# Patient Record
Sex: MALE | Race: BLACK OR AFRICAN AMERICAN | NOT HISPANIC OR LATINO | Employment: STUDENT | URBAN - METROPOLITAN AREA
[De-identification: names, ages, dates, MRNs, and addresses within clinical notes are randomized per-mention and may not be internally consistent; named-entity substitution may affect disease eponyms.]

---
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
## 2024-07-13 ENCOUNTER — HOSPITAL ENCOUNTER (EMERGENCY)
Facility: HOSPITAL | Age: 19
Discharge: HOME | End: 2024-07-14
Attending: SURGERY

## 2024-07-13 DIAGNOSIS — T78.40XA ALLERGIC REACTION, INITIAL ENCOUNTER: Primary | ICD-10-CM

## 2024-07-13 PROCEDURE — 96375 TX/PRO/DX INJ NEW DRUG ADDON: CPT

## 2024-07-13 PROCEDURE — 63600175 PHARM REV CODE 636 W HCPCS: Performed by: SURGERY

## 2024-07-13 PROCEDURE — 96361 HYDRATE IV INFUSION ADD-ON: CPT

## 2024-07-13 PROCEDURE — 96372 THER/PROPH/DIAG INJ SC/IM: CPT | Performed by: SURGERY

## 2024-07-13 PROCEDURE — 99284 EMERGENCY DEPT VISIT MOD MDM: CPT | Mod: 25

## 2024-07-13 PROCEDURE — 25000003 PHARM REV CODE 250: Performed by: SURGERY

## 2024-07-13 PROCEDURE — 96374 THER/PROPH/DIAG INJ IV PUSH: CPT

## 2024-07-13 RX ORDER — EPINEPHRINE 0.3 MG/.3ML
1 INJECTION SUBCUTANEOUS ONCE
Qty: 0.3 ML | Refills: 0 | Status: SHIPPED | OUTPATIENT
Start: 2024-07-13 | End: 2024-07-13

## 2024-07-13 RX ORDER — EPINEPHRINE 0.3 MG/.3ML
0.3 INJECTION SUBCUTANEOUS
Status: COMPLETED | OUTPATIENT
Start: 2024-07-13 | End: 2024-07-13

## 2024-07-13 RX ORDER — DIPHENHYDRAMINE HYDROCHLORIDE 50 MG/ML
50 INJECTION INTRAMUSCULAR; INTRAVENOUS
Status: COMPLETED | OUTPATIENT
Start: 2024-07-13 | End: 2024-07-13

## 2024-07-13 RX ORDER — FAMOTIDINE 10 MG/ML
40 INJECTION INTRAVENOUS
Status: COMPLETED | OUTPATIENT
Start: 2024-07-13 | End: 2024-07-13

## 2024-07-13 RX ORDER — METHYLPREDNISOLONE SOD SUCC 125 MG
125 VIAL (EA) INJECTION
Status: COMPLETED | OUTPATIENT
Start: 2024-07-13 | End: 2024-07-13

## 2024-07-13 RX ORDER — DIPHENHYDRAMINE HCL 50 MG
50 CAPSULE ORAL EVERY 6 HOURS PRN
Qty: 20 CAPSULE | Refills: 0 | Status: SHIPPED | OUTPATIENT
Start: 2024-07-13

## 2024-07-13 RX ORDER — PREDNISONE 20 MG/1
40 TABLET ORAL DAILY
Qty: 10 TABLET | Refills: 0 | Status: SHIPPED | OUTPATIENT
Start: 2024-07-13 | End: 2024-07-18

## 2024-07-13 RX ADMIN — DIPHENHYDRAMINE HYDROCHLORIDE 50 MG: 50 INJECTION, SOLUTION INTRAMUSCULAR; INTRAVENOUS at 10:07

## 2024-07-13 RX ADMIN — EPINEPHRINE 0.3 MG: 0.3 INJECTION INTRAMUSCULAR at 10:07

## 2024-07-13 RX ADMIN — FAMOTIDINE 40 MG: 10 INJECTION INTRAVENOUS at 10:07

## 2024-07-13 RX ADMIN — METHYLPREDNISOLONE SODIUM SUCCINATE 125 MG: 125 INJECTION, POWDER, FOR SOLUTION INTRAMUSCULAR; INTRAVENOUS at 10:07

## 2024-07-13 RX ADMIN — SODIUM CHLORIDE 1000 ML: 9 INJECTION, SOLUTION INTRAVENOUS at 10:07

## 2024-07-14 VITALS
DIASTOLIC BLOOD PRESSURE: 78 MMHG | OXYGEN SATURATION: 98 % | HEART RATE: 76 BPM | TEMPERATURE: 98 F | SYSTOLIC BLOOD PRESSURE: 128 MMHG | RESPIRATION RATE: 20 BRPM | BODY MASS INDEX: 29.61 KG/M2 | HEIGHT: 74 IN | WEIGHT: 230.69 LBS

## 2024-07-14 NOTE — ED PROVIDER NOTES
Encounter Date: 7/13/2024       History     Chief Complaint   Patient presents with    Allergic Reaction     PT TO ER WITH C/O ALLERGIC REACTION WHILE EATING DINNER. PT THINKS IT IS FROM SEAFOOD. PT REPORTS THROAT AND LIP SWELLING     History of Present Illness  Lam Rodriguez is a 18 y.o. male that presents with lip swelling tonight  Patient ate fried seafood at [facility] [employer] for dinner tonight  Patient states he began to start to have itching with lip swelling after  Patient with no signs of anaphylaxis, no signs of airway concerns now  No stridor, no drooling, normal patent airway on initial evaluation tonight  No previous issues with seafood with no previous allergic reactions    The history is provided by the patient.     Review of patient's allergies indicates:  No Known Allergies  Past Medical History:   Diagnosis Date    Asthma      History reviewed. No pertinent surgical history.  No family history on file.  Social History     Tobacco Use    Smoking status: Never   Substance Use Topics    Alcohol use: No     Review of Systems   Constitutional:  Negative for diaphoresis, fatigue and fever.   HENT:  Negative for ear pain, hearing loss and tinnitus.    Eyes:  Negative for pain and redness.   Respiratory:  Negative for cough, shortness of breath and wheezing.    Cardiovascular:  Negative for chest pain.   Gastrointestinal:  Negative for abdominal pain, constipation, diarrhea, nausea and rectal pain.   Musculoskeletal:  Negative for back pain, neck pain and neck stiffness.   Skin:         (+) pruritus & lip angioedema   Neurological:  Negative for dizziness, seizures, numbness and headaches.   Psychiatric/Behavioral:  Negative for confusion, decreased concentration and dysphoric mood.    All other systems reviewed and are negative.      Physical Exam     Initial Vitals [07/13/24 2155]   BP Pulse Resp Temp SpO2   134/89 83 20 98.4 °F (36.9 °C) 96 %      MAP       --         Physical Exam    Nursing note  and vitals reviewed.  Constitutional: Vital signs are normal. He appears well-developed and well-nourished. He is cooperative.   HENT:   Head: Normocephalic and atraumatic.   Mouth/Throat: Uvula is midline and mucous membranes are normal.   (+) upper & lower lip angioedema, normal patent airway  (-) tongue swelling   Eyes: Conjunctivae, EOM and lids are normal. Pupils are equal, round, and reactive to light.   Neck: Neck supple.   Normal range of motion.   Full passive range of motion without pain.     Cardiovascular:  Normal rate, regular rhythm, S1 normal, S2 normal, normal heart sounds, intact distal pulses and normal pulses.           Pulmonary/Chest: Effort normal and breath sounds normal.   Abdominal: Abdomen is soft and flat. Bowel sounds are normal.   Musculoskeletal:         General: Normal range of motion.      Cervical back: Full passive range of motion without pain, normal range of motion and neck supple.     Neurological: He is alert and oriented to person, place, and time. He has normal strength.   Skin: Skin is warm, dry and intact. Capillary refill takes less than 2 seconds.         ED Course   Procedures  Labs Reviewed - No data to display       Imaging Results    None          Medications   sodium chloride 0.9% bolus 1,000 mL 1,000 mL (0 mLs Intravenous Stopped 7/13/24 2310)   diphenhydrAMINE injection 50 mg (50 mg Intravenous Given 7/13/24 2224)   methylPREDNISolone sodium succinate injection 125 mg (125 mg Intravenous Given 7/13/24 2223)   EPINEPHrine (EPIPEN) 0.3 mg/0.3 mL pen injection 0.3 mg (0.3 mg Intramuscular Given 7/13/24 2211)   famotidine (PF) injection 40 mg (40 mg Intravenous Given 7/13/24 2224)     Medical Decision Making  Itching, lip swelling after eating seafood at a restaurant this evening  Differential includes local reaction, allergic reaction, angioedema, anaphylaxis    Problems Addressed:  Allergic reaction, initial encounter: complicated acute illness or injury    ED  Management & Risks of Complication, Morbidity, & Mortality:  Patient given IV Benadryl & steroids immediately in ED  Patient given IV Pepcid immediately in ED this evening  Patient given EpiPen injection with good result in the ER   Angioedema of the lips improved, itching has dissipated  Patient was now feeling completely asymptomatic prior to discharge  Patient monitored in the ER over 3 hours with no other issues noted  Benadryl, steroids prescribed, EpiPen for emergencies on discharge  Follow-up with PCP for definitive allergy testing going forward  Pt/Family counseled to return to the ER with any concerns on DC    Critical Care ED Physician Time (minutes):  -- Performed by: Jose Angel Byrd M.D.  -- Date/Time: 12:19 AM 7/14/2024   -- Direct Patient Care (Face Time): 5  -- Additional History from Records or Taking Additional History: 5  -- Ordering, Reviewing, and Interpreting Diagnostic Studies: 0  -- Total Time in Documentation: 15  -- Consultation with Other Physicians: 0  -- Consultation with Family Related to Condition: 10  -- Total Critical Care Time: 35  -- Critical care was necessary to treat Allergic reaction with EpiPen  -- Critical care was time spent personally by me on the following activities:   -- development of treatment plan with patient & their family  -- examination of patient and evaluation of response to treatment     Need for Hospitalization or Surgery with Social Determinants of Health:  This patient does not need to be hospitalized on ER evaluation today  The patient's diagnosis is not limited by social determinants of health  Does not require surgery or procedure (major or minor), no risk factors    Clinical Impression:  Final diagnoses:  [T78.40XA] Allergic reaction, initial encounter (Primary)          ED Disposition Condition    Discharge Stable          ED Prescriptions       Medication Sig Dispense Start Date End Date Auth. Provider    diphenhydrAMINE (BENADRYL) 50 MG capsule Take 1  capsule (50 mg total) by mouth every 6 (six) hours as needed for Itching. 20 capsule 2024 -- Jose Angel Byrd MD    predniSONE (DELTASONE) 20 MG tablet Take 2 tablets (40 mg total) by mouth once daily. for 5 days 10 tablet 2024 Jose Angel Byrd MD    EPINEPHrine (EPIPEN 2-MARYANA) 0.3 mg/0.3 mL AtIn () Inject 0.3 mLs (0.3 mg total) into the muscle once. for 1 dose 0.3 mL 2024 Jose Angel Byrd MD          Follow-up Information       Follow up With Specialties Details Why Contact Info    [facility] Speech Pathology Schedule an appointment as soon as possible for a visit in 2 days  [address]  [facility] [id number] [phone]               Jose Angel Byrd MD  24 0021

## 2024-07-14 NOTE — ED TRIAGE NOTES
18 y.o. male presents to ER ED 02/ED 02B   Chief Complaint   Patient presents with    Allergic Reaction     PT TO ER WITH C/O ALLERGIC REACTION WHILE EATING DINNER. PT THINKS IT IS FROM SEAFOOD. PT REPORTS THROAT AND LIP SWELLING   . No acute distress noted.

## 2024-07-14 NOTE — ED NOTES
Patient's father to desk wanting update on POC.  Dr. Byrd notified and went to bedside.  States that due to medication given, Epinephrine, patient needs to be monitor for another hour.  Patient's mother and father verbalized understanding.  Denies any further needs at this time.

## 2025-03-18 ENCOUNTER — HOSPITAL ENCOUNTER (EMERGENCY)
Facility: HOSPITAL | Age: 20
Discharge: PSYCHIATRIC OR SUBSTANCE USE FACILITY | End: 2025-03-18
Attending: STUDENT IN AN ORGANIZED HEALTH CARE EDUCATION/TRAINING PROGRAM
Payer: MEDICAID

## 2025-03-18 ENCOUNTER — HOSPITAL ENCOUNTER (INPATIENT)
Facility: HOSPITAL | Age: 20
LOS: 7 days | Discharge: HOME | DRG: 885 | End: 2025-03-25
Attending: PSYCHIATRY & NEUROLOGY | Admitting: PSYCHIATRY & NEUROLOGY
Payer: MEDICAID

## 2025-03-18 VITALS
SYSTOLIC BLOOD PRESSURE: 136 MMHG | HEIGHT: 74 IN | RESPIRATION RATE: 16 BRPM | WEIGHT: 230 LBS | TEMPERATURE: 99 F | HEART RATE: 79 BPM | BODY MASS INDEX: 29.52 KG/M2 | DIASTOLIC BLOOD PRESSURE: 78 MMHG | OXYGEN SATURATION: 99 %

## 2025-03-18 DIAGNOSIS — R45.851 SUICIDAL IDEATION: Primary | ICD-10-CM

## 2025-03-18 DIAGNOSIS — F33.2 MDD (MAJOR DEPRESSIVE DISORDER), RECURRENT SEVERE, WITHOUT PSYCHOSIS: Primary | ICD-10-CM

## 2025-03-18 DIAGNOSIS — R45.851 SUICIDAL IDEATION: ICD-10-CM

## 2025-03-18 LAB
ALBUMIN SERPL BCP-MCNC: 4.6 G/DL (ref 3.5–5.2)
ALP SERPL-CCNC: 71 U/L (ref 40–150)
ALT SERPL W/O P-5'-P-CCNC: 19 U/L (ref 10–44)
AMPHET+METHAMPHET UR QL: NEGATIVE
ANION GAP SERPL CALC-SCNC: 8 MMOL/L (ref 8–16)
APAP SERPL-MCNC: <3 UG/ML (ref 10–20)
AST SERPL-CCNC: 19 U/L (ref 10–40)
BACTERIA #/AREA URNS HPF: NORMAL /HPF
BARBITURATES UR QL SCN>200 NG/ML: NEGATIVE
BASOPHILS # BLD AUTO: 0.12 K/UL (ref 0–0.2)
BASOPHILS NFR BLD: 2.1 % (ref 0–1.9)
BENZODIAZ UR QL SCN>200 NG/ML: NEGATIVE
BILIRUB SERPL-MCNC: 0.4 MG/DL (ref 0.1–1)
BILIRUB UR QL STRIP: NEGATIVE
BUN SERPL-MCNC: 15 MG/DL (ref 6–20)
BZE UR QL SCN: NEGATIVE
CALCIUM SERPL-MCNC: 9.6 MG/DL (ref 8.7–10.5)
CANNABINOIDS UR QL SCN: NEGATIVE
CHLORIDE SERPL-SCNC: 106 MMOL/L (ref 95–110)
CLARITY UR: CLEAR
CO2 SERPL-SCNC: 26 MMOL/L (ref 23–29)
COLOR UR: YELLOW
CREAT SERPL-MCNC: 1.1 MG/DL (ref 0.5–1.4)
CREAT UR-MCNC: >450 MG/DL (ref 23–375)
DIFFERENTIAL METHOD BLD: ABNORMAL
EOSINOPHIL # BLD AUTO: 0.1 K/UL (ref 0–0.5)
EOSINOPHIL NFR BLD: 1.6 % (ref 0–8)
ERYTHROCYTE [DISTWIDTH] IN BLOOD BY AUTOMATED COUNT: 12.2 % (ref 11.5–14.5)
EST. GFR  (NO RACE VARIABLE): >60 ML/MIN/1.73 M^2
ETHANOL SERPL-MCNC: <10 MG/DL
GLUCOSE SERPL-MCNC: 109 MG/DL (ref 70–110)
GLUCOSE UR QL STRIP: NEGATIVE
HCT VFR BLD AUTO: 41.7 % (ref 40–54)
HGB BLD-MCNC: 14.6 G/DL (ref 14–18)
HGB UR QL STRIP: NEGATIVE
HYALINE CASTS #/AREA URNS LPF: 0 /LPF
IMM GRANULOCYTES # BLD AUTO: 0.02 K/UL (ref 0–0.04)
IMM GRANULOCYTES NFR BLD AUTO: 0.4 % (ref 0–0.5)
KETONES UR QL STRIP: ABNORMAL
LEUKOCYTE ESTERASE UR QL STRIP: NEGATIVE
LYMPHOCYTES # BLD AUTO: 1.6 K/UL (ref 1–4.8)
LYMPHOCYTES NFR BLD: 27.4 % (ref 18–48)
MCH RBC QN AUTO: 28.9 PG (ref 27–31)
MCHC RBC AUTO-ENTMCNC: 35 G/DL (ref 32–36)
MCV RBC AUTO: 83 FL (ref 82–98)
METHADONE UR QL SCN>300 NG/ML: NEGATIVE
MICROSCOPIC COMMENT: NORMAL
MONOCYTES # BLD AUTO: 0.4 K/UL (ref 0.3–1)
MONOCYTES NFR BLD: 7 % (ref 4–15)
NEUTROPHILS # BLD AUTO: 3.5 K/UL (ref 1.8–7.7)
NEUTROPHILS NFR BLD: 61.5 % (ref 38–73)
NITRITE UR QL STRIP: NEGATIVE
NRBC BLD-RTO: 0 /100 WBC
OPIATES UR QL SCN: NEGATIVE
PCP UR QL SCN>25 NG/ML: NEGATIVE
PH UR STRIP: 6 [PH] (ref 5–8)
PLATELET # BLD AUTO: 226 K/UL (ref 150–450)
PMV BLD AUTO: 9.2 FL (ref 9.2–12.9)
POTASSIUM SERPL-SCNC: 3.7 MMOL/L (ref 3.5–5.1)
PROT SERPL-MCNC: 8.2 G/DL (ref 6–8.4)
PROT UR QL STRIP: ABNORMAL
RBC # BLD AUTO: 5.05 M/UL (ref 4.6–6.2)
RBC #/AREA URNS HPF: 0 /HPF (ref 0–4)
SODIUM SERPL-SCNC: 140 MMOL/L (ref 136–145)
SP GR UR STRIP: >=1.03 (ref 1–1.03)
SQUAMOUS #/AREA URNS HPF: 1 /HPF
TOXICOLOGY INFORMATION: ABNORMAL
URN SPEC COLLECT METH UR: ABNORMAL
UROBILINOGEN UR STRIP-ACNC: NEGATIVE EU/DL
WBC # BLD AUTO: 5.69 K/UL (ref 3.9–12.7)
WBC #/AREA URNS HPF: 1 /HPF (ref 0–5)

## 2025-03-18 PROCEDURE — 11400000 HC PSYCH PRIVATE ROOM

## 2025-03-18 PROCEDURE — 80143 DRUG ASSAY ACETAMINOPHEN: CPT | Performed by: STUDENT IN AN ORGANIZED HEALTH CARE EDUCATION/TRAINING PROGRAM

## 2025-03-18 PROCEDURE — 80307 DRUG TEST PRSMV CHEM ANLYZR: CPT | Performed by: STUDENT IN AN ORGANIZED HEALTH CARE EDUCATION/TRAINING PROGRAM

## 2025-03-18 PROCEDURE — 82077 ASSAY SPEC XCP UR&BREATH IA: CPT | Performed by: STUDENT IN AN ORGANIZED HEALTH CARE EDUCATION/TRAINING PROGRAM

## 2025-03-18 PROCEDURE — 85025 COMPLETE CBC W/AUTO DIFF WBC: CPT | Performed by: STUDENT IN AN ORGANIZED HEALTH CARE EDUCATION/TRAINING PROGRAM

## 2025-03-18 PROCEDURE — 80061 LIPID PANEL: CPT | Performed by: PSYCHIATRY & NEUROLOGY

## 2025-03-18 PROCEDURE — 25000003 PHARM REV CODE 250: Performed by: PSYCHIATRY & NEUROLOGY

## 2025-03-18 PROCEDURE — 80053 COMPREHEN METABOLIC PANEL: CPT | Performed by: STUDENT IN AN ORGANIZED HEALTH CARE EDUCATION/TRAINING PROGRAM

## 2025-03-18 PROCEDURE — 99231 SBSQ HOSP IP/OBS SF/LOW 25: CPT | Mod: ,,, | Performed by: PHYSICIAN ASSISTANT

## 2025-03-18 PROCEDURE — 99285 EMERGENCY DEPT VISIT HI MDM: CPT

## 2025-03-18 PROCEDURE — 81000 URINALYSIS NONAUTO W/SCOPE: CPT | Mod: 59 | Performed by: STUDENT IN AN ORGANIZED HEALTH CARE EDUCATION/TRAINING PROGRAM

## 2025-03-18 PROCEDURE — 83036 HEMOGLOBIN GLYCOSYLATED A1C: CPT | Performed by: PSYCHIATRY & NEUROLOGY

## 2025-03-18 PROCEDURE — 99223 1ST HOSP IP/OBS HIGH 75: CPT | Mod: ,,, | Performed by: STUDENT IN AN ORGANIZED HEALTH CARE EDUCATION/TRAINING PROGRAM

## 2025-03-18 PROCEDURE — 90833 PSYTX W PT W E/M 30 MIN: CPT | Mod: ,,, | Performed by: STUDENT IN AN ORGANIZED HEALTH CARE EDUCATION/TRAINING PROGRAM

## 2025-03-18 RX ORDER — DIPHENHYDRAMINE HYDROCHLORIDE 50 MG/ML
50 INJECTION, SOLUTION INTRAMUSCULAR; INTRAVENOUS EVERY 6 HOURS PRN
Status: DISCONTINUED | OUTPATIENT
Start: 2025-03-18 | End: 2025-03-18 | Stop reason: HOSPADM

## 2025-03-18 RX ORDER — IBUPROFEN 200 MG
1 TABLET ORAL DAILY PRN
Status: DISCONTINUED | OUTPATIENT
Start: 2025-03-18 | End: 2025-03-25 | Stop reason: HOSPADM

## 2025-03-18 RX ORDER — TALC
6 POWDER (GRAM) TOPICAL NIGHTLY PRN
Status: DISCONTINUED | OUTPATIENT
Start: 2025-03-18 | End: 2025-03-25 | Stop reason: HOSPADM

## 2025-03-18 RX ORDER — HYDROXYZINE PAMOATE 50 MG/1
50 CAPSULE ORAL EVERY 6 HOURS PRN
Status: DISCONTINUED | OUTPATIENT
Start: 2025-03-18 | End: 2025-03-25 | Stop reason: HOSPADM

## 2025-03-18 RX ORDER — IBUPROFEN 200 MG
1 TABLET ORAL DAILY
Status: DISCONTINUED | OUTPATIENT
Start: 2025-03-18 | End: 2025-03-18

## 2025-03-18 RX ORDER — OLANZAPINE 10 MG/1
10 TABLET ORAL EVERY 8 HOURS PRN
Status: DISCONTINUED | OUTPATIENT
Start: 2025-03-18 | End: 2025-03-25 | Stop reason: HOSPADM

## 2025-03-18 RX ORDER — HALOPERIDOL LACTATE 5 MG/ML
5 INJECTION, SOLUTION INTRAMUSCULAR EVERY 6 HOURS PRN
Status: DISCONTINUED | OUTPATIENT
Start: 2025-03-18 | End: 2025-03-18 | Stop reason: HOSPADM

## 2025-03-18 RX ORDER — LORAZEPAM 2 MG/ML
2 INJECTION INTRAMUSCULAR EVERY 4 HOURS PRN
Status: DISCONTINUED | OUTPATIENT
Start: 2025-03-18 | End: 2025-03-18 | Stop reason: HOSPADM

## 2025-03-18 RX ORDER — OLANZAPINE 10 MG/2ML
10 INJECTION, POWDER, FOR SOLUTION INTRAMUSCULAR EVERY 8 HOURS PRN
Status: DISCONTINUED | OUTPATIENT
Start: 2025-03-18 | End: 2025-03-25 | Stop reason: HOSPADM

## 2025-03-18 RX ADMIN — Medication 6 MG: at 08:03

## 2025-03-18 RX ADMIN — HYDROXYZINE PAMOATE 50 MG: 50 CAPSULE ORAL at 08:03

## 2025-03-18 NOTE — PLAN OF CARE
Patient hesitant to make eye concontact; walks slouched with arms crossed.  Denies intentions to harm self and/or others at this time. Denies auditory and/or visual hallucinations.  Affect and emotion blunted and depressed. Minimal interactions with peers and staff; isolates in bed.  Accepts meals and medications.  Discussed medication and plan of care as well as healthy coping skills and techniques; staff will continue to educate and reinforce.    Did NOT attend group.  Did NOT return call from mom.

## 2025-03-18 NOTE — PSYCH
"Meditation/Education:  [facility] : Meditation Exercise  Patient Response:  "Patient attended session and found the session helpful".              "

## 2025-03-18 NOTE — PLAN OF CARE
Plan of care reviewed. Denies intent to harm self or others at this time. Denies hallucinations. Patient isolates in room. He seems reluctant to speak, answering questions in a soft, almost inaudible voice. His responses are minimal. Patient refuses supper, even though encouraged to eat by staff. Gait steady, no falls. Almost no interaction noted with staff and peers. Will continue precautions and monitor for safety.
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
## 2025-03-18 NOTE — PLAN OF CARE
Pt guarded and withdrawn with a flat affect. Pt admits to feeling suicidal with a plan but did not want to discuss the plan. Pt reports his depression and anxiety is a 7 out 10. He also does not want his parents given any information. Patient denies HI no self harming behavior displayed, no aggressive behavior displayed towards others. Patient contracted safety with staff and unit.  Educated, reviewed  and discussed plan of care and medication regiment with this patient. Patient voiced understanding of all teachings

## 2025-03-18 NOTE — PLAN OF CARE
Problem: Adult Behavioral Health Plan of Care  Goal: Optimized Coping Skills in Response to Life Stressors  Outcome: Progressing  Intervention: Promote Effective Coping Strategies  Flowsheets (Taken 3/18/2025 1803)  Supportive Measures:   active listening utilized   counseling provided   relaxation techniques promoted  PROCESS GROUP    Behavior:  PLPC met with patient individually.  Pt was laying in bed with covers over body, but sat up to discuss process group. Pt presents depressed, lack of interest, and withdrawn, speech very low and soft spoken.     Intervention:    During this session, the therapist distributed a worksheet on coping skills. PLPCexplained the significance of coping skills and how they can be helpful in managing stressful situations, particularly in grounding oneself during heightened moments. Patient was then asked to share a stressful situation in which they had to utilize coping skills. Patient described how he/she felt during that time and the specific techniques they used to calm themselves. Patient will use these worksheet as a tool to practice different coping strategies in future stress-inducing situations.    Response:  The patient states he was very tired and laid back down.      0
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
## 2025-03-18 NOTE — MEDICAL/APP STUDENT
" PSYCHIATRY INPATIENT ADMISSION NOTE - H & P      3/18/2025 9:04 AM   Lam Rodriguez   2005   [id number]         DATE OF ADMISSION: 3/18/2025  4:24 AM    SITE: [facility]    CURRENT LEGAL STATUS: PEC and/or CEC      HISTORY    CHIEF COMPLAINT   Lam Rodriguez is a 19 y.o. male with no past psychiatric history currently admitted to the inpatient unit with the following chief complaint: thoughts of death/suicide    HPI   The patient was seen and examined. The chart was reviewed.    The patient presented to the ER on 3/18/2025 .    The patient was medically cleared and admitted to the U.    Patient presents with: Suicidal Ideation    Parents brought into ED following comments that patient is "tired of living" [place] himself. When asked what prompted this response, patient said "It just happened." Patient continues to endorse suicidal ideation.    Said he has had "thoughts of killing myself" for weeks Patient states he was "ready to do something about it", had a plan, hesitant to share. Could not pinpoint an impetus. Rates current depression at 7/10; shifting from 4/10 - 9/10 over the past few weeks. Endorses changes in sleep, problems falling and staying asleep and waking up feeling tired, sleeps about 6hrs a night.    Patient also reports a strained relationship with parents, stating he just feels like they don't want him there. States parents have yelled at him, which scared him, but they have never physically hurt him. They punish him by taking away TV, games, etc.     Used to work at a [employer] company, but stopped working there last year. Stopped working there due to a fuss with a supervisor and he quit.    No alcohol  No tobacco  No illicit substances    Symptoms of Depression: diminished mood - Yes, loss of interest/anhedonia - Yes;  recurrent - Yes, >14 days - Yes, diminished energy - Yes, change in sleep - Yes, change in appetite - " "Yes, diminished concentration or cognition or indecisiveness - Yes, PMA/R -  Yes, excessive guilt or hopelessness or worthlessness - Yes, suicidal ideations - Yes    Changes in Sleep: trouble with initiation- Yes, maintenance, - Yes early morning awakening with inability to return to sleep - No, hypersomnolence - No    Suicidal- active/passive ideations - Yes, organized plans, future intentions - Yes    Homicidal ideations: active/passive ideations - No, organized plans, future intentions - No    Symptoms of psychosis: hallucinations - No, delusions - No, disorganized speech - No, disorganized behavior or abnormal motor behavior - No, or negative symptoms (diminshed emotional expression, avolition, anhedonia, alogia, asociality) - No, active phase symptoms >1 month - No, continuous signs of illness > 6 months - No, since onset of illness decreased level of functioning present - No    Symptoms of dai or hypomania: elevated, expansive, or irritable mood with increased energy or activity - No; > 4 days - No,  >7 days - No; with inflated self-esteem or grandiosity - No, decreased need for sleep - No, increased rate of speech - No, FOI or racing thoughts - No, distractibility - No, increased goal directed activity or PMA - No, risky/disinhibited behavior - No    Symptoms of OLIVIA: excessive anxiety/worry/fear, more days than not, about numerous issues - No, ongoing for >6 months - No, difficult to control - No, with restlessness - No, fatigue - Yes, poor concentration - Yes, irritability - No, muscle tension - No, sleep disturbance - No; causes functionally impairing distress - No    Symptoms of Panic Disorder: recurrent panic attacks (palpitations/heart racing, sweating, shakiness, dyspnea, choking, chest pain/discomfort, Gi symptoms, dizzy/lightheadedness, hot/col flashes, paresthesias, derealization, fear of losing control or fear of dying or fear of "going crazy") - No, precipitated - No, un-precipitated - No, source " of worry and/or behavioral changes secondary for 1 month or longer- No, agoraphobia - No    Symptoms of PTSD: h/o trauma exposure - No; re-experiencing/intrusive symptoms - No, avoidant behavior - No, 2 or more negative alterations in cognition or mood - No, 2 or more hyperarousal symptoms - No; with dissociative symptoms - No, ongoing for 1 or more  months - No    Symptoms of OCD: obsessions (recurrent thoughts/urges/images; intrusive and/or unwanted; uses other thoughts/actions to suppress) - No; compulsions (repetitive behaviors used to lower distress/anxiety/obsessions) - No, time-consuming (over 1 hour per day) or cause significant distress/impairment - - No    Symptoms of Anorexia: restriction of caloric intake leading to significantly low body weight - No, intense fear of gaining weight or persistent behavior that interferes with weight gain even thought at a significantly low weight - No, disturbance in the way in which one's body weight or shape is experienced, undue influence of body weight or shape on self evaluation, or persistent lack of recognition of the seriousness of the current low body weight - No    Symptoms of Bulimia: recurrent episodes of binge eating (definitely larger amount  than what others would eat and lack of a sense of control over eating during episode) - No, recurrent inappropriate compensatory behaviors in order to prevent weight gain (fasting, medications, exercise, vomiting) - No, binges and compensatory behaviors both occur on average at least once a week for 3 months - No, self evaluations is unduly influenced by body shape/weight- - No    Symptoms of Binge eating: recurrent episodes of binge eating (definitely larger amount than what others would eat and lack of a sense of control over eating during episode) - No, 3 or more of following (eating much more rapidly, eating until uncomfortably full, large amounts when not hungry, eating alone because of embarrassed by how much,   feeling disgusted with oneself, depressed or very guilty afterward) - No, distress regarding binges - No, binges occur on average at least once a week for 3 months - No      Substance/s:  Taken in larger amounts or over longer periods than intended: No,  Persistent desire or unsuccessful attempts to cut down or stop: No,  Great deal of time spent seeking, using or recovering from: No,  Craving or strong desire to use: No,  Recurrent use despite failure to meet major role obligation: No,  Continued use despite persistent or recurrent social/interparsonal issues due to use: No,  Important social/work/recreational activities given up due to use: No,  Recurrent use in physically hazardous situations: No,  Continued use despite knowledge of persistent physical or psychological problem: No,  Tolerance (either increased need or diminished effect): No,      PAST PSYCHIATRIC HISTORY  Previous Psychiatric Hospitalizations: No  Previous SI/HI: Yes,  Previous Suicide Attempts: No,   Previous Medication Trials: No,  Psychiatric Care (current & past): No,  History of Psychotherapy: No,  History of Violence: No,  History of sexual/physical abuse: No,    PAST MEDICAL & SURGICAL HISTORY   Past Medical History:   Diagnosis Date    Asthma      No past surgical history on file.      CURRENT PSYCH MEDICATION REGIMEN   No medications    Home Meds:   Prior to Admission medications    Medication Sig Start Date End Date Taking? Authorizing Provider   acetaminophen (TYLENOL) 500 MG tablet Take 2 tablets (1,000 mg total) by mouth 3 (three) times daily as needed for Pain. 11/4/16   Quyen Johnston MD   albuterol (PROVENTIL) 2.5 mg /3 mL (0.083 %) nebulizer solution Take 3 mLs (2.5 mg total) by nebulization every 6 (six) hours as needed for Wheezing. 5/29/17 5/29/18  Jose Angel Byrd MD   amoxicillin-clavulanate 875-125mg (AUGMENTIN) 875-125 mg per tablet Take 1 tablet by mouth 2 (two) times daily. 2/22/22   Ivonne Brown, NP  "  diphenhydrAMINE (BENADRYL) 50 MG capsule Take 1 capsule (50 mg total) by mouth every 6 (six) hours as needed for Itching. 7/13/24   Jose Angel Byrd MD   EPINEPHrine (EPIPEN 2-MARYANA) 0.3 mg/0.3 mL AtIn Inject 0.3 mLs (0.3 mg total) into the muscle once. for 1 dose 7/13/24 7/13/24  Jose Angel Byrd MD   ibuprofen (ADVIL,MOTRIN) 400 MG tablet Take 1 tablet (400 mg total) by mouth every 6 (six) hours as needed (pain). 11/7/18   Shavon Cantu NP       Scheduled Meds:        PRN Meds:   Current Facility-Administered Medications:     hydrOXYzine pamoate, 50 mg, Oral, Q6H PRN    melatonin, 6 mg, Oral, Nightly PRN    nicotine, 1 patch, Transdermal, Daily PRN    OLANZapine, 10 mg, Oral, Q8H PRN **AND** OLANZapine, 10 mg, Intramuscular, Q8H PRN   Psychotherapeutics (From admission, onward)      Start     Stop Route Frequency Ordered    03/18/25 0510  OLANZapine tablet 10 mg  (Olanzapine PRN (</= 66 yo))        Placed in "And" Linked Group    -- Oral Every 8 hours PRN 03/18/25 0438    03/18/25 0510  OLANZapine injection 10 mg  (Olanzapine PRN (</= 66 yo))        Placed in "And" Linked Group    -- IM Every 8 hours PRN 03/18/25 0438            ALLERGIES   Review of patient's allergies indicates:   Allergen Reactions    Shellfish containing products Hives, Itching and Edema       NEUROLOGIC HISTORY  Seizures: No  Head trauma: No    SOCIAL HISTORY:  Developmental/Childhood:Achieved all developmental milestones timely  Education:High School Diploma  Employment Status/Finances:Unemployed   Relationship Status/Sexual Orientation: single  Children: 0  Housing Status: Home    history:  NO   Access to Firearms: does not know ;  Locked up? n/a  Presybeterian:Non-spiritual  Recreational activities: watching TV, action movies    SUBSTANCE ABUSE HISTORY   Recreational Drugs:  none    Use of Alcohol: denied  Rehab History:no   Tobacco Use:no    LEGAL HISTORY:   Past charges/incarcerations: NO  Pending charges:NO    FAMILY PSYCHIATRIC " HISTORY   No family history on file.    Biological father has bipolar/schizophrenia       ROS  Review of Systems   Constitutional: Negative.    Respiratory:  Negative for cough, shortness of breath and wheezing.    Cardiovascular:  Negative for chest pain and palpitations.   Gastrointestinal: Negative.  Negative for nausea and vomiting.   Genitourinary: Negative.    Psychiatric/Behavioral:  Positive for depression and suicidal ideas.          EXAMINATION    PHYSICAL EXAM  Reviewed note/exam by Dr. Soriano from  at [facility]    VITALS   Vitals:    03/18/25 0713   BP: (!) 103/57   Pulse: 87   Resp: 18   Temp: 98.4 °F (36.9 °C)        Body mass index is 31.48 kg/m².        PAIN  0/10  Subjective report of pain matches objective signs and symptoms: Yes    LABORATORY DATA   Recent Results (from the past 72 hours)   CBC auto differential    Collection Time: 03/18/25  1:33 AM   Result Value Ref Range    WBC 5.69 3.90 - 12.70 K/uL    RBC 5.05 4.60 - 6.20 M/uL    Hemoglobin 14.6 14.0 - 18.0 g/dL    Hematocrit 41.7 40.0 - 54.0 %    MCV 83 82 - 98 fL    MCH 28.9 27.0 - 31.0 pg    MCHC 35.0 32.0 - 36.0 g/dL    RDW 12.2 11.5 - 14.5 %    Platelets 226 150 - 450 K/uL    MPV 9.2 9.2 - 12.9 fL    Immature Granulocytes 0.4 0.0 - 0.5 %    Gran # (ANC) 3.5 1.8 - 7.7 K/uL    Immature Grans (Abs) 0.02 0.00 - 0.04 K/uL    Lymph # 1.6 1.0 - 4.8 K/uL    Mono # 0.4 0.3 - 1.0 K/uL    Eos # 0.1 0.0 - 0.5 K/uL    Baso # 0.12 0.00 - 0.20 K/uL    nRBC 0 0 /100 WBC    Gran % 61.5 38.0 - 73.0 %    Lymph % 27.4 18.0 - 48.0 %    Mono % 7.0 4.0 - 15.0 %    Eosinophil % 1.6 0.0 - 8.0 %    Basophil % 2.1 (H) 0.0 - 1.9 %    Differential Method Automated    Comprehensive metabolic panel    Collection Time: 03/18/25  1:33 AM   Result Value Ref Range    Sodium 140 136 - 145 mmol/L    Potassium 3.7 3.5 - 5.1 mmol/L    Chloride 106 95 - 110 mmol/L    CO2 26 23 - 29 mmol/L    Glucose 109 70 - 110 mg/dL    BUN 15 6 - 20 mg/dL    Creatinine 1.1 0.5 -  "1.4 mg/dL    Calcium 9.6 8.7 - 10.5 mg/dL    Total Protein 8.2 6.0 - 8.4 g/dL    Albumin 4.6 3.5 - 5.2 g/dL    Total Bilirubin 0.4 0.1 - 1.0 mg/dL    Alkaline Phosphatase 71 40 - 150 U/L    AST 19 10 - 40 U/L    ALT 19 10 - 44 U/L    eGFR >60 >60 mL/min/1.73 m^2    Anion Gap 8 8 - 16 mmol/L   Ethanol    Collection Time: 03/18/25  1:33 AM   Result Value Ref Range    Alcohol, Serum <10 <10 mg/dL   Acetaminophen level    Collection Time: 03/18/25  1:33 AM   Result Value Ref Range    Acetaminophen (Tylenol), Serum <3.0 (L) 10.0 - 20.0 ug/mL   Urinalysis, Reflex to Urine Culture Urine, Clean Catch    Collection Time: 03/18/25  3:12 AM    Specimen: Urine   Result Value Ref Range    Specimen UA Urine, Clean Catch     Color, UA Yellow Yellow, Straw, Jody    Appearance, UA Clear Clear    pH, UA 6.0 5.0 - 8.0    Specific Gravity, UA >=1.030 (A) 1.005 - 1.030    Protein, UA 2+ (A) Negative    Glucose, UA Negative Negative    Ketones, UA 1+ (A) Negative    Bilirubin (UA) Negative Negative    Occult Blood UA Negative Negative    Nitrite, UA Negative Negative    Urobilinogen, UA Negative <2.0 EU/dL    Leukocytes, UA Negative Negative   Drug screen panel, emergency    Collection Time: 03/18/25  3:12 AM   Result Value Ref Range    Benzodiazepines Negative Negative    Methadone metabolites Negative Negative    Cocaine (Metab.) Negative Negative    Opiate Scrn, Ur Negative Negative    Barbiturate Screen, Ur Negative Negative    Amphetamine Screen, Ur Negative Negative    THC Negative Negative    Phencyclidine Negative Negative    Creatinine, Urine >450.0 (H) 23.0 - 375.0 mg/dL    Toxicology Information SEE COMMENT    Urinalysis Microscopic    Collection Time: 03/18/25  3:12 AM   Result Value Ref Range    RBC, UA 0 0 - 4 /hpf    WBC, UA 1 0 - 5 /hpf    Bacteria Rare None-Occ /hpf    Squam Epithel, UA 1 /hpf    Hyaline Casts, UA 0 0-1/lpf /lpf    Microscopic Comment SEE COMMENT       No results found for: "PHENYTOIN", "PHENOBARB", " ""VALPROATE", "CBMZ"        CONSTITUTIONAL  General Appearance: unremarkable, age appropriate    MUSCULOSKELETAL  Muscle Strength and Tone:no tremor, no tic  Abnormal Involuntary Movements: No  Gait and Station: non-ataxic    PSYCHIATRIC   Level of Consciousness: awake and lethargic  Orientation: person, place, and situation  Grooming: Hospital garb and Well groomed  Psychomotor Behavior: normal, cooperative, eye contact minimal  Speech: normal pitch, slowed, increased latency of response, soft, monotone  Language: grossly intact  Mood: depressed and sad  Affect: Consistent with mood and Flat  Thought Process: linear, logical  Associations: intact   Thought Content: +SI, denies HI, and no delusions  Perceptions: +AH  Memory: Able to recall past events, Remote intact, and Recent intact  Attention:Attends to interview without distraction  Fund of Knowledge: Aware of current events and Vocabulary appropriate   Estimate if Intelligence:  Average based on work/education history, vocabulary and mental status exam  Insight: has awareness of illness  Judgment: behavior is adequate to circumstances      PSYCHOSOCIAL    PSYCHOSOCIAL STRESSORS   family    FUNCTIONING RELATIONSHIPS   alone & isolated and poor relationship with parents    STRENGTHS AND LIABILITIES   Strength: Patient is physically healthy.    Is the patient aware of the biomedical complications associated with substance abuse and mental illness? yes    Does the patient have an Advance Directive for Mental Health treatment? no  (If yes, inform patient to bring copy.)        MEDICAL DECISION MAKING      ASSESSMENT   Major depressive episode, severe, with suicidal ideation  Unspecific anxiety disorder    Psychosocial stressors    Asthma      PROBLEM LIST AND MANAGEMENT PLANS    Major depressive episode, severe, with suicidal ideation  -patient in U for safety  -patient not given any meds upon arrival  -trial SSRI; escitalopram 10mg PO qd   -up titrate until sx " under control (20mg qd escitalopram max)  -trazodone PO @ bedtime for sleep  -refer for psychotherapy    Unspecified anxiety disorder  -start escitalopram 10mg pO qd per above  -trazodone PO @ bedtime for sleep  -refer for psychotherapy    Psychosocial stressors  -involve SW for assistance in resources    Asthma  -chronic condition  -no longer taking inhaler PRN  -meds reviewed      Discussed diagnosis, risks and benefits of proposed treatment vs alternative treatments vs no treatment, potential side effects of these treatments and the inherent unpredictability of treatment. The patient expresses understanding of the above and displays the capacity to agree with this treatment given said understanding. Patient also agrees that, currently, the benefits outweigh the risks and would like to pursue/continue treatment at this time.    Any medications being used off-label were discussed with the patient inclusive of the evidence base for the use of the medications and consent was obtained for the off-label use of the medication.       DIAGNOSTIC TESTING  Labs reviewed with patient; follow up pending labs    Disposition:  -Will attempt to obtain outside psychiatric records if available  -SW to assist with aftercare planning and collateral  -Once stable discharge home with outpatient follow up care and/or rehab  -Continue inpatient treatment under a PEC and/or CEC for danger to self/ danger to others/grave disability as evident by danger to self, gravely disabled, and suicidal ideation        Mary Ellen Fountain, MS3  Psychiatry

## 2025-03-18 NOTE — PROGRESS NOTES
"   03/18/25 1246   [facility] Group Therapy   Group Name Therapeutic Recreation   Specific Interventions Cognitive Stimulation Training   Participation Level Minimal   Participation Quality Lack of Interest;Withdrawn   Insight/Motivation Limited   Affect/Mood Display Depressed;Flat   Cognition Alert   Psychomotor WNL     Patient presents depressed, flat, withdrawn, lacks interest, speech low & softly spoken, poor eye contact. Patient shared he is thankful for "my parents helping me." Patient then asked "do I have to do this and got up and returned to his room.  "

## 2025-03-19 LAB
CHOLEST SERPL-MCNC: 104 MG/DL (ref 120–199)
CHOLEST/HDLC SERPL: 1.9 {RATIO} (ref 2–5)
ESTIMATED AVG GLUCOSE: 97 MG/DL (ref 68–131)
HBA1C MFR BLD: 5 % (ref 4–5.6)
HDLC SERPL-MCNC: 56 MG/DL (ref 40–75)
HDLC SERPL: 53.8 % (ref 20–50)
LDLC SERPL CALC-MCNC: 35.4 MG/DL (ref 63–159)
NONHDLC SERPL-MCNC: 48 MG/DL
TRIGL SERPL-MCNC: 63 MG/DL (ref 30–150)

## 2025-03-19 PROCEDURE — 11400000 HC PSYCH PRIVATE ROOM

## 2025-03-19 PROCEDURE — 25000003 PHARM REV CODE 250: Performed by: PSYCHIATRY & NEUROLOGY

## 2025-03-19 PROCEDURE — 25000003 PHARM REV CODE 250: Performed by: STUDENT IN AN ORGANIZED HEALTH CARE EDUCATION/TRAINING PROGRAM

## 2025-03-19 PROCEDURE — 99233 SBSQ HOSP IP/OBS HIGH 50: CPT | Mod: ,,, | Performed by: STUDENT IN AN ORGANIZED HEALTH CARE EDUCATION/TRAINING PROGRAM

## 2025-03-19 PROCEDURE — 90833 PSYTX W PT W E/M 30 MIN: CPT | Mod: ,,, | Performed by: STUDENT IN AN ORGANIZED HEALTH CARE EDUCATION/TRAINING PROGRAM

## 2025-03-19 RX ORDER — ESCITALOPRAM OXALATE 10 MG/1
10 TABLET ORAL DAILY
Status: DISCONTINUED | OUTPATIENT
Start: 2025-03-19 | End: 2025-03-24

## 2025-03-19 RX ADMIN — Medication 6 MG: at 08:03

## 2025-03-19 RX ADMIN — ESCITALOPRAM OXALATE 10 MG: 10 TABLET ORAL at 11:03

## 2025-03-19 RX ADMIN — HYDROXYZINE PAMOATE 50 MG: 50 CAPSULE ORAL at 08:03

## 2025-03-19 NOTE — PSYCH
"Meditation/Education :  Meet Keesha : Meditation Exercise  Patient Response:  "Patient attended session and found the session helpful".              "

## 2025-03-19 NOTE — SUBJECTIVE & OBJECTIVE
Past Medical History:   Diagnosis Date    Asthma        No past surgical history on file.    Review of patient's allergies indicates:   Allergen Reactions    Shellfish containing products Hives, Itching and Edema       Current Facility-Administered Medications on File Prior to Encounter   Medication    [DISCONTINUED] diphenhydrAMINE injection 50 mg    [DISCONTINUED] haloperidol lactate injection 5 mg    [DISCONTINUED] LORazepam injection 2 mg     Current Outpatient Medications on File Prior to Encounter   Medication Sig    acetaminophen (TYLENOL) 500 MG tablet Take 2 tablets (1,000 mg total) by mouth 3 (three) times daily as needed for Pain.    albuterol (PROVENTIL) 2.5 mg /3 mL (0.083 %) nebulizer solution Take 3 mLs (2.5 mg total) by nebulization every 6 (six) hours as needed for Wheezing.    amoxicillin-clavulanate 875-125mg (AUGMENTIN) 875-125 mg per tablet Take 1 tablet by mouth 2 (two) times daily.    diphenhydrAMINE (BENADRYL) 50 MG capsule Take 1 capsule (50 mg total) by mouth every 6 (six) hours as needed for Itching.    EPINEPHrine (EPIPEN 2-MARYANA) 0.3 mg/0.3 mL AtIn Inject 0.3 mLs (0.3 mg total) into the muscle once. for 1 dose    ibuprofen (ADVIL,MOTRIN) 400 MG tablet Take 1 tablet (400 mg total) by mouth every 6 (six) hours as needed (pain).     Family History    None       Tobacco Use    Smoking status: Never    Smokeless tobacco: Not on file   Substance and Sexual Activity    Alcohol use: No    Drug use: Not on file    Sexual activity: Not on file     Review of Systems   Constitutional:  Negative for chills and fever.   Respiratory:  Negative for cough, shortness of breath and wheezing.    Cardiovascular:  Negative for chest pain and palpitations.   Gastrointestinal:  Negative for abdominal distention, constipation, diarrhea, nausea and vomiting.   Genitourinary:  Negative for difficulty urinating and dysuria.     Objective:     Vital Signs (Most Recent):  Temp: 98.2 °F (36.8 °C) (03/18/25 1912)  Pulse:  72 (03/18/25 1912)  Resp: 20 (03/18/25 1912)  BP: 122/67 (03/18/25 1912)  SpO2: 100 % (03/18/25 1912) Vital Signs (24h Range):  Temp:  [97.8 °F (36.6 °C)-99.2 °F (37.3 °C)] 98.2 °F (36.8 °C)  Pulse:  [68-87] 72  Resp:  [16-20] 20  SpO2:  [99 %-100 %] 100 %  BP: (103-158)/(57-98) 122/67     Weight: 105.3 kg (232 lb 2.3 oz)  Body mass index is 31.48 kg/m².     Physical Exam  Constitutional:       Appearance: Normal appearance.   HENT:      Head: Normocephalic and atraumatic.   Cardiovascular:      Rate and Rhythm: Normal rate and regular rhythm.   Pulmonary:      Effort: Pulmonary effort is normal.      Breath sounds: Normal breath sounds.   Abdominal:      General: There is no distension.      Tenderness: There is no abdominal tenderness.   Musculoskeletal:         General: No swelling or tenderness.      Cervical back: Neck supple.      Right lower leg: No edema.      Left lower leg: No edema.   Skin:     General: Skin is warm and dry.   Neurological:      Mental Status: He is alert.      Cranial Nerves: Cranial nerves 2-12 are intact.      Comments: CN II: Visual Fields full to confrontation  CN III, IV , VI PERRL   CN III: no palsy   Nystagmus: none  Diplopia: none  Ophthalmoparesis: none  CN V Facial sensation intact  CN VII facial expression full, symmetric  CN VIII normal  CN IX normal  CN X normal  CN XI normal  CN XII normal           Significant Labs: UPT  No results found for this or any previous visit.  U/A  Negative for UTI   UDS  Results for orders placed or performed during the hospital encounter of 03/18/25   Drug screen panel, emergency    Collection Time: 03/18/25  3:12 AM   Result Value Ref Range    Benzodiazepines Negative Negative    Methadone metabolites Negative Negative    Cocaine (Metab.) Negative Negative    Opiate Scrn, Ur Negative Negative    Barbiturate Screen, Ur Negative Negative    Amphetamine Screen, Ur Negative Negative    THC Negative Negative    Phencyclidine Negative Negative     Creatinine, Urine >450.0 (H) 23.0 - 375.0 mg/dL    Toxicology Information SEE COMMENT      CBC  Results for orders placed or performed during the hospital encounter of 03/18/25   CBC auto differential    Collection Time: 03/18/25  1:33 AM   Result Value Ref Range    WBC 5.69 3.90 - 12.70 K/uL    RBC 5.05 4.60 - 6.20 M/uL    Hemoglobin 14.6 14.0 - 18.0 g/dL    Hematocrit 41.7 40.0 - 54.0 %    MCV 83 82 - 98 fL    MCH 28.9 27.0 - 31.0 pg    MCHC 35.0 32.0 - 36.0 g/dL    RDW 12.2 11.5 - 14.5 %    Platelets 226 150 - 450 K/uL    MPV 9.2 9.2 - 12.9 fL    Immature Granulocytes 0.4 0.0 - 0.5 %    Gran # (ANC) 3.5 1.8 - 7.7 K/uL    Immature Grans (Abs) 0.02 0.00 - 0.04 K/uL    Lymph # 1.6 1.0 - 4.8 K/uL    Mono # 0.4 0.3 - 1.0 K/uL    Eos # 0.1 0.0 - 0.5 K/uL    Baso # 0.12 0.00 - 0.20 K/uL    nRBC 0 0 /100 WBC    Gran % 61.5 38.0 - 73.0 %    Lymph % 27.4 18.0 - 48.0 %    Mono % 7.0 4.0 - 15.0 %    Eosinophil % 1.6 0.0 - 8.0 %    Basophil % 2.1 (H) 0.0 - 1.9 %    Differential Method Automated      CMP  Results for orders placed or performed during the hospital encounter of 03/18/25   Comprehensive metabolic panel    Collection Time: 03/18/25  1:33 AM   Result Value Ref Range    Sodium 140 136 - 145 mmol/L    Potassium 3.7 3.5 - 5.1 mmol/L    Chloride 106 95 - 110 mmol/L    CO2 26 23 - 29 mmol/L    Glucose 109 70 - 110 mg/dL    BUN 15 6 - 20 mg/dL    Creatinine 1.1 0.5 - 1.4 mg/dL    Calcium 9.6 8.7 - 10.5 mg/dL    Total Protein 8.2 6.0 - 8.4 g/dL    Albumin 4.6 3.5 - 5.2 g/dL    Total Bilirubin 0.4 0.1 - 1.0 mg/dL    Alkaline Phosphatase 71 40 - 150 U/L    AST 19 10 - 40 U/L    ALT 19 10 - 44 U/L    eGFR >60 >60 mL/min/1.73 m^2    Anion Gap 8 8 - 16 mmol/L     TSH  No results found for this or any previous visit.  ETOH  Results for orders placed or performed during the hospital encounter of 03/18/25   Ethanol    Collection Time: 03/18/25  1:33 AM   Result Value Ref Range    Alcohol, Serum <10 <10 mg/dL     Salicylate  No  results found for this or any previous visit.  Acetaminophen  Results for orders placed or performed during the hospital encounter of 03/18/25   Acetaminophen level    Collection Time: 03/18/25  1:33 AM   Result Value Ref Range    Acetaminophen (Tylenol), Serum <3.0 (L) 10.0 - 20.0 ug/mL             Significant Imaging: none

## 2025-03-19 NOTE — CONSULTS
[place] [facility]  Adult Nutrition  Consult Note    SUMMARY     Recommendations  1. Continue regular diet.   2. RD to follow.    Goals: Patient will continue to meet > 75% ENN prior to RD follow up.  Nutrition Goal Status: new  Communication of RD Recs: other (comment) (POC)    Nutrition Discharge Planning     Nutrition Discharge Planning: General healthy diet    Assessment and Plan    No nutrition diagnosis at this time. Please re-consult if any acute nutrition concerns arise prior to RD follow up on 4/26/25. Thank you.      Malnutrition Assessment  No NFPE - PEC  Reason for Assessment    Reason For Assessment: consult  Diagnosis: psychological disorder (SI)  Past Medical History:   Diagnosis Date    Asthma      General Information Comments: Consult received for new BHU admit. Patient is a 18 yo M who presented with thoughts of death/suicide. On regular diet. Meeting ENN at this time with % meal intake plus snacks. LBM unknown. GI system WDL per flowsheet. Weight stable per chart review. Will continue to monitor.    Nutrition/Diet History    Spiritual, Cultural Beliefs, Moravian Practices, Values that Affect Care: other (see comments) (none identified)  Food Allergies: shellfish    Nutrition Related Social Determinants of Health: SDOH: Unable to assess at this time.     Anthropometrics    Height: 6' (182.9 cm)  Height (inches): 72 in  Height Method: Stated  Weight: 103.9 kg (229 lb 2.7 oz)  Weight (lb): 229.17 lb  Weight Method: Standard Scale  Ideal Body Weight (IBW), Male: 178 lb  % Ideal Body Weight, Male (lb): 130.42 %  BMI (Calculated): 31.1  BMI Grade: 30 - 34.9- obesity - grade I     Lab/Procedures/Meds    Pertinent Labs Reviewed: reviewed  Pertinent Medications Reviewed: reviewed  Scheduled Meds:   EScitalopram oxalate  10 mg Oral Daily     Estimated/Assessed Needs    Weight Used For Calorie Calculations: 103.9 kg (229 lb 0.9 oz)  Energy Calorie Requirements (kcal): 2087  Energy Need  Method: Yi Naranjo (MSJ no AF)  Protein Requirements: 83 g (0.8 g/kg)  Weight Used For Protein Calculations: 103.9 kg (229 lb 0.9 oz)  Fluid Requirements (mL): 1 mL/kcal  Estimated Fluid Requirement Method: RDA Method  RDA Method (mL): 2087     Nutrition Prescription Ordered    Current Diet Order: regular  Oral Nutrition Supplement: none    Evaluation of Received Nutrient/Fluid Intake    I/O: N/A  Energy Calories Required: meeting needs  Protein Required: meeting needs  Fluid Required: other (see comments) (AMY)  Tolerance: tolerating  % Intake of Estimated Energy Needs: 75 - 100 %  % Meal Intake: Other: %    Nutrition Risk    Level of Risk/Frequency of Follow-up: low (1x per week)     Monitor and Evaluation    Monitor and Evaluation: Energy intake, Protein intake, Food and beverage intake, Beliefs and attitudes     Nutrition Follow-Up    RD Follow-up?: Yes    Audrey Narvaez RDN, KAELYNN

## 2025-03-19 NOTE — PSYCH
"Meditation/Education:  [employer] Shayla : Meditation Exercise  Patient Response:  "Patient attended session and found the session helpful".              "

## 2025-03-19 NOTE — PLAN OF CARE
"Patient isolating in room, no interaction with peers. Speaks when prompted. Flat affect. Depressed mood. Patient states he has no job and just sits home watching tv. Encouraged patient to attend groups and activities on the unit. Patient rates depression 8/10 on scale 1-10 with 10 highest level of depression and rates anxiety 7/10 on same scale. Regarding SI patient states he has suicidal thoughts "a lil bit sometimes and does not know why"  No self harming behavior displayed, no aggressive behavior displayed towards others. Patient verbally contracted for safety with staff and unit. Discussed healthy coping skills and techniques. Educated, reviewed and discussed plan of care and medication regiment with this patient 1:1. Patient voiced understanding of all teachings.          "

## 2025-03-19 NOTE — PLAN OF CARE
"  Problem: Adult Behavioral Health Plan of Care  Goal: Optimized Coping Skills in Response to Life Stressors  Intervention: Promote Effective Coping Strategies  Flowsheets (Taken 3/19/2025 0903)  Supportive Measures:   active listening utilized   counseling provided   self-responsibility promoted   Behavior: PLPC met with pt individually. Pt affect flat with depressed, lacked interest, and withdrawn mood.      Intervention:  Patiens were to engage in conversations with PLPC about how feeling overwhelmed, disrespected, or exhausted by interactions with certain people. Patients were to recognizing that sometimes, relationships or situations must be let go in order for personal growth and peace to flourish. By using the "Let Them Go" theory patients will learn to begin the process of releasing harmful attachments and regain peace in their lives. Patients were to practice saying "no," limit interactions, and prioritize emotional well-being by distancing from negativity.        Response:   Pt states he  does not have anything at this time.    Plan: Set boundaries, limit contact with the individual or situation, and focus on self-development activities like reading, meditating, or pursuing new interests.  "

## 2025-03-19 NOTE — PROGRESS NOTES
"   03/19/25 1000   [facility] Group Therapy   Group Name Other  (1:1 attempt)   Participation Level None   Participation Quality Refused;Lack of Interest;Withdrawn     Patient isolating in assigned room, sitting on the edge of the bed, looking down, presents depressed, withdrawn, lacks interest, softly spoken, reports a "depressed" mood. Patient was offered a skilled worksheet focused on positive steps to wellbeing as an alternative to group.  "

## 2025-03-19 NOTE — PLAN OF CARE
Recommendations  1. Continue regular diet.   2. RD to follow.    Goals: Patient will continue to meet > 75% ENN prior to RD follow up.  Nutrition Goal Status: new

## 2025-03-19 NOTE — PROGRESS NOTES
PSYCHIATRY DAILY INPATIENT PROGRESS NOTE  SUBSEQUENT HOSPITAL VISIT    ENCOUNTER DATE: 3/19/2025  SITE: [facility]    DATE OF ADMISSION: 3/18/2025  4:24 AM  LENGTH OF STAY: 1 days      CHIEF COMPLAINT   Lam Rodriguez is a 19 y.o. male, seen during daily ng rounds on the inpatient unit.  Lam Rodriguez presented with the chief complaint of depression and anxiety      The patient was seen and examined. The chart was reviewed.     Reviewed notes from Rns and labs from the last 24 hours.    The patient's case was discussed with the treatment team/care providers today including Rns and PA    Staff reports no behavioral or management issues.     The patient has been compliant with treatment.      Subjective 03/19/2025       Today the patient reports continued depression. Behavior is withdrawn and isolative. Pt states he has not spoken to his family. Encouraged pt to participate in groups. Provided motivational interviewing, pt agrees to attempt to be more active.    The patient denies any side effects to medications.    At this time symptoms remains severe, functionally and behaviorally impairing and pt remains in need of continued acute inpatient hospitalization for both safety and stabilization.           Interim/overnight events per report/notes:    Per RN:       Patient hesitant to make eye concontact; walks slouched with arms crossed.  Denies intentions to harm self and/or others at this time. Denies auditory and/or visual hallucinations.  Affect and emotion blunted and depressed. Minimal interactions with peers and staff; isolates in bed.          Did NOT attend group.  Did NOT return call from mom.        Psychiatric ROS (observed, reported, or endorsed/denied):  Depressed mood - Continuing  Interest/pleasure/anhedonia: Continuing  Guilt/hopelessness/worthlessness - Continuing  Changes in Sleep - less  Changes in Appetite - Continuing  Changes in Concentration - Continuing  Changes in Energy -  Continuing  PMA/R- Continuing  Suicidal- active/passive ideations - less  Homicidal ideations: active/passive ideations - No    Hallucinations - No  Delusions - No  Disorganized behavior - No  Disorganized speech - No  Negative symptoms - No    Elevated mood - No  Decreased need for sleep - No  Grandiosity - No  Racing thoughts - No  Impulsivity - No  Irritability- No  Increased energy - No  Distractibility - No  Increase in goal-directed activity or PMA- No    Symptoms of OLIVIA - fluctuating  Symptoms of Panic Disorder- No  Symptoms of PTSD - No  Symptoms of OCD- less      Overall progress:  minimal        Psychotherapy:  Target symptoms: depression, anxiety   Why chosen therapy is appropriate versus another modality: relevant to diagnosis  Outcome monitoring methods: self-report  Therapeutic intervention type: supportive psychotherapy  Topics discussed/themes: building skills sets for symptom management, symptom recognition  The patient's response to the intervention is accepting. The patient's progress toward treatment goals is limited.   Duration of intervention: 16 minutes.        Medical ROS  Review of Systems   Constitutional:  Negative for chills and fever.   HENT:  Negative for hearing loss.    Eyes:  Negative for blurred vision and double vision.   Respiratory:  Negative for shortness of breath.    Cardiovascular:  Negative for chest pain and palpitations.   Gastrointestinal:  Negative for constipation, diarrhea, nausea and vomiting.   Genitourinary:  Negative for dysuria.   Musculoskeletal:  Negative for back pain and neck pain.   Skin:  Negative for rash.   Neurological:  Negative for dizziness and headaches.   Endo/Heme/Allergies:  Negative for environmental allergies.         PAST MEDICAL HISTORY   Past Medical History:   Diagnosis Date    Asthma            PSYCHOTROPIC MEDICATIONS   Scheduled Meds:  Continuous Infusions:  PRN Meds:.  Current Facility-Administered Medications:     hydrOXYzine pamoate, 50  mg, Oral, Q6H PRN    melatonin, 6 mg, Oral, Nightly PRN    nicotine, 1 patch, Transdermal, Daily PRN    OLANZapine, 10 mg, Oral, Q8H PRN **AND** OLANZapine, 10 mg, Intramuscular, Q8H PRN        EXAMINATION    VITALS   Vitals:    03/18/25 0713 03/18/25 1912 03/19/25 0729 03/19/25 0800   BP: (!) 103/57 122/67 122/65    BP Location:  Left arm     Patient Position:  Lying Sitting    Pulse: 87 72 75    Resp: 18 20 20    Temp: 98.4 °F (36.9 °C) 98.2 °F (36.8 °C) 98.2 °F (36.8 °C)    TempSrc:  Temporal Tympanic    SpO2: 99% 100% 99%    Weight:    103.9 kg (229 lb 2.7 oz)   Height:           Body mass index is 31.08 kg/m².        CONSTITUTIONAL  General Appearance: unremarkable, age appropriate    MUSCULOSKELETAL  Muscle Strength and Tone:no tremor, no tic  Abnormal Involuntary Movements: No  Gait and Station: non-ataxic    PSYCHIATRIC   Level of Consciousness: awake and alert   Orientation: person, place, and situation  Grooming: Hospital garb and Well groomed  Psychomotor Behavior: psychomotor retardation  Speech: soft, non-spontaneous, monotone  Language: grossly intact  Mood: depressed  Affect: Consistent with mood  Thought Process: linear, logical  Associations: intact   Thought Content: +SI, denies HI, and no delusions  Perceptions: denies AH and denies  VH  Memory: Able to recall past events, Remote intact, and Recent intact  Attention:Attends to interview without distraction  Fund of Knowledge: Aware of current events and Vocabulary appropriate   Estimate if Intelligence:  Average based on work/education history, vocabulary and mental status exam  Insight: has awareness of illness  Judgment: behavior is adequate to circumstances        DIAGNOSTIC TESTING   Laboratory Results  No results found for this or any previous visit (from the past 24 hours).        MEDICAL DECISION MAKING          ASSESSMENT      MDD, recurrent, severe  OCD  Social anxiety  Psychosocial stressors        PROBLEM LIST AND MANAGEMENT PLANS            MDD, recurrent, severe  - start lexapro 10 mg PO qd  - pt counseled  - follow up with outpatient mental health providers after discharge for medication management and psychotherapy     OCD  - start lexapro 10 mg PO qd  - pt counseled  - follow up with outpatient mental health providers after discharge for medication management and psychotherapy    Social anxiety  - start lexapro 10 mg PO qd  - pt counseled  - follow up with outpatient mental health providers after discharge for medication management and psychotherapy        Psychosocial stressors  - pt counseled  - SW consulted for assistance with additional resources                  Discussed diagnosis, risks and benefits of proposed treatment vs alternative treatments vs no treatment, potential side effects of these treatments and the inherent unpredictability of treatment. The patient expresses understanding of the above and displays the capacity to agree with this treatment given said understanding. Patient also agrees that, currently, the benefits outweigh the risks and would like to pursue/continue treatment at this time.    Any medications being used off-label were discussed with the patient inclusive of the evidence base for the use of the medications and consent was obtained for the off-label use of the medication.       DISCHARGE PLANNING  Expected Disposition Plan: Home or Self Care      NEED FOR CONTINUED HOSPITALIZATION  Psychiatric illness continues to pose a potential threat to life or bodily function, of self or others, thereby requiring the need for continued inpatient psychiatric hospitalization: Yes, due to: danger to self, as evidenced by:  Concerns with SI--Fading.    Protective inpatient pyschiatric hospitalization required while a safe disposition plan is enacted: Yes    Patient stabilized and ready for discharge from inpatient psychiatric unit: No        STAFF:   Ji Romeo III, MD  Psychiatry
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
## 2025-03-19 NOTE — CONSULTS
[facility] Medicine  Consult Note    Patient Name: Lam Rodriguez  MRN: [id number]  Admission Date: 3/18/2025  Hospital Length of Stay: 0 days  Attending Physician: Carl Newton MD   Primary Care Provider: Gia Bergman [facility] Of           Patient information was obtained from patient and ER records.     Inpatient consult to [facility] for History and Physical  Consult performed by: Tasneem Petersen PA-C  Consult ordered by: Carl Newton MD        Subjective:     Principal Problem: Suicidal ideation    Chief Complaint: No chief complaint on file.       HPI: Patient is a 19 year old male with medical history of asthma who was admitted to [facility] for suicidal ideation.  Hospital medicine consulted for medical management.        Past Medical History:   Diagnosis Date    Asthma        No past surgical history on file.    Review of patient's allergies indicates:   Allergen Reactions    Shellfish containing products Hives, Itching and Edema       Current Facility-Administered Medications on File Prior to Encounter   Medication    [DISCONTINUED] diphenhydrAMINE injection 50 mg    [DISCONTINUED] haloperidol lactate injection 5 mg    [DISCONTINUED] LORazepam injection 2 mg     Current Outpatient Medications on File Prior to Encounter   Medication Sig    acetaminophen (TYLENOL) 500 MG tablet Take 2 tablets (1,000 mg total) by mouth 3 (three) times daily as needed for Pain.    albuterol (PROVENTIL) 2.5 mg /3 mL (0.083 %) nebulizer solution Take 3 mLs (2.5 mg total) by nebulization every 6 (six) hours as needed for Wheezing.    amoxicillin-clavulanate 875-125mg (AUGMENTIN) 875-125 mg per tablet Take 1 tablet by mouth 2 (two) times daily.    diphenhydrAMINE (BENADRYL) 50 MG capsule Take 1 capsule (50 mg total) by mouth every 6 (six) hours as needed for Itching.    EPINEPHrine (EPIPEN 2-MARYANA) 0.3 mg/0.3 mL AtIn Inject 0.3 mLs (0.3 mg total) into the muscle once. for 1 dose     ibuprofen (ADVIL,MOTRIN) 400 MG tablet Take 1 tablet (400 mg total) by mouth every 6 (six) hours as needed (pain).     Family History    None       Tobacco Use    Smoking status: Never    Smokeless tobacco: Not on file   Substance and Sexual Activity    Alcohol use: No    Drug use: Not on file    Sexual activity: Not on file     Review of Systems   Constitutional:  Negative for chills and fever.   Respiratory:  Negative for cough, shortness of breath and wheezing.    Cardiovascular:  Negative for chest pain and palpitations.   Gastrointestinal:  Negative for abdominal distention, constipation, diarrhea, nausea and vomiting.   Genitourinary:  Negative for difficulty urinating and dysuria.     Objective:     Vital Signs (Most Recent):  Temp: 98.2 °F (36.8 °C) (03/18/25 1912)  Pulse: 72 (03/18/25 1912)  Resp: 20 (03/18/25 1912)  BP: 122/67 (03/18/25 1912)  SpO2: 100 % (03/18/25 1912) Vital Signs (24h Range):  Temp:  [97.8 °F (36.6 °C)-99.2 °F (37.3 °C)] 98.2 °F (36.8 °C)  Pulse:  [68-87] 72  Resp:  [16-20] 20  SpO2:  [99 %-100 %] 100 %  BP: (103-158)/(57-98) 122/67     Weight: 105.3 kg (232 lb 2.3 oz)  Body mass index is 31.48 kg/m².     Physical Exam  Constitutional:       Appearance: Normal appearance.   HENT:      Head: Normocephalic and atraumatic.   Cardiovascular:      Rate and Rhythm: Normal rate and regular rhythm.   Pulmonary:      Effort: Pulmonary effort is normal.      Breath sounds: Normal breath sounds.   Abdominal:      General: There is no distension.      Tenderness: There is no abdominal tenderness.   Musculoskeletal:         General: No swelling or tenderness.      Cervical back: Neck supple.      Right lower leg: No edema.      Left lower leg: No edema.   Skin:     General: Skin is warm and dry.   Neurological:      Mental Status: He is alert.      Cranial Nerves: Cranial nerves 2-12 are intact.      Comments: CN II: Visual Fields full to confrontation  CN III, IV , VI PERRL   CN III: no palsy    Nystagmus: none  Diplopia: none  Ophthalmoparesis: none  CN V Facial sensation intact  CN VII facial expression full, symmetric  CN VIII normal  CN IX normal  CN X normal  CN XI normal  CN XII normal           Significant Labs: UPT  No results found for this or any previous visit.  U/A  Negative for UTI   UDS  Results for orders placed or performed during the hospital encounter of 03/18/25   Drug screen panel, emergency    Collection Time: 03/18/25  3:12 AM   Result Value Ref Range    Benzodiazepines Negative Negative    Methadone metabolites Negative Negative    Cocaine (Metab.) Negative Negative    Opiate Scrn, Ur Negative Negative    Barbiturate Screen, Ur Negative Negative    Amphetamine Screen, Ur Negative Negative    THC Negative Negative    Phencyclidine Negative Negative    Creatinine, Urine >450.0 (H) 23.0 - 375.0 mg/dL    Toxicology Information SEE COMMENT      CBC  Results for orders placed or performed during the hospital encounter of 03/18/25   CBC auto differential    Collection Time: 03/18/25  1:33 AM   Result Value Ref Range    WBC 5.69 3.90 - 12.70 K/uL    RBC 5.05 4.60 - 6.20 M/uL    Hemoglobin 14.6 14.0 - 18.0 g/dL    Hematocrit 41.7 40.0 - 54.0 %    MCV 83 82 - 98 fL    MCH 28.9 27.0 - 31.0 pg    MCHC 35.0 32.0 - 36.0 g/dL    RDW 12.2 11.5 - 14.5 %    Platelets 226 150 - 450 K/uL    MPV 9.2 9.2 - 12.9 fL    Immature Granulocytes 0.4 0.0 - 0.5 %    Gran # (ANC) 3.5 1.8 - 7.7 K/uL    Immature Grans (Abs) 0.02 0.00 - 0.04 K/uL    Lymph # 1.6 1.0 - 4.8 K/uL    Mono # 0.4 0.3 - 1.0 K/uL    Eos # 0.1 0.0 - 0.5 K/uL    Baso # 0.12 0.00 - 0.20 K/uL    nRBC 0 0 /100 WBC    Gran % 61.5 38.0 - 73.0 %    Lymph % 27.4 18.0 - 48.0 %    Mono % 7.0 4.0 - 15.0 %    Eosinophil % 1.6 0.0 - 8.0 %    Basophil % 2.1 (H) 0.0 - 1.9 %    Differential Method Automated      CMP  Results for orders placed or performed during the hospital encounter of 03/18/25   Comprehensive metabolic panel    Collection Time: 03/18/25   1:33 AM   Result Value Ref Range    Sodium 140 136 - 145 mmol/L    Potassium 3.7 3.5 - 5.1 mmol/L    Chloride 106 95 - 110 mmol/L    CO2 26 23 - 29 mmol/L    Glucose 109 70 - 110 mg/dL    BUN 15 6 - 20 mg/dL    Creatinine 1.1 0.5 - 1.4 mg/dL    Calcium 9.6 8.7 - 10.5 mg/dL    Total Protein 8.2 6.0 - 8.4 g/dL    Albumin 4.6 3.5 - 5.2 g/dL    Total Bilirubin 0.4 0.1 - 1.0 mg/dL    Alkaline Phosphatase 71 40 - 150 U/L    AST 19 10 - 40 U/L    ALT 19 10 - 44 U/L    eGFR >60 >60 mL/min/1.73 m^2    Anion Gap 8 8 - 16 mmol/L     TSH  No results found for this or any previous visit.  ETOH  Results for orders placed or performed during the hospital encounter of 03/18/25   Ethanol    Collection Time: 03/18/25  1:33 AM   Result Value Ref Range    Alcohol, Serum <10 <10 mg/dL     Salicylate  No results found for this or any previous visit.  Acetaminophen  Results for orders placed or performed during the hospital encounter of 03/18/25   Acetaminophen level    Collection Time: 03/18/25  1:33 AM   Result Value Ref Range    Acetaminophen (Tylenol), Serum <3.0 (L) 10.0 - 20.0 ug/mL             Significant Imaging: none  Assessment/Plan:     * Suicidal ideation  Defer to psych for management  Medically cleared         VTE Risk Mitigation (From admission, onward)      None                Thank you for your consult. I will sign off. Please contact us if you have any additional questions.    Tasneem Petersen PA-C  Department of Hospital Medicine   [facility]

## 2025-03-19 NOTE — PROGRESS NOTES
03/19/25 1400   [facility] Group Therapy   Group Name Therapeutic Recreation   Specific Interventions Cognitive Stimulation Training   Participation Level Appropriate   Participation Quality Cooperative   Insight/Motivation Improved   Affect/Mood Display Depressed   Cognition Alert   Psychomotor WNL     Patient isolating in room when approached, reluctant to attend group, needed encouragement, presents depressed, lacks interest, holding head down, poor eye contact. Patient was quiet unless approached, gave minimal response, but responses were appropriate and relating to the skilled activity.

## 2025-03-19 NOTE — HPI
Patient is a 19 year old male with medical history of asthma who was admitted to [facility] for suicidal ideation.  Hospital medicine consulted for medical management.

## 2025-03-20 PROCEDURE — 25000003 PHARM REV CODE 250: Performed by: STUDENT IN AN ORGANIZED HEALTH CARE EDUCATION/TRAINING PROGRAM

## 2025-03-20 PROCEDURE — 11400000 HC PSYCH PRIVATE ROOM

## 2025-03-20 PROCEDURE — 90833 PSYTX W PT W E/M 30 MIN: CPT | Mod: ,,, | Performed by: PSYCHIATRY & NEUROLOGY

## 2025-03-20 PROCEDURE — 25000003 PHARM REV CODE 250: Performed by: PSYCHIATRY & NEUROLOGY

## 2025-03-20 PROCEDURE — 99233 SBSQ HOSP IP/OBS HIGH 50: CPT | Mod: ,,, | Performed by: PSYCHIATRY & NEUROLOGY

## 2025-03-20 RX ORDER — ARIPIPRAZOLE 2 MG/1
2 TABLET ORAL DAILY
Status: DISCONTINUED | OUTPATIENT
Start: 2025-03-20 | End: 2025-03-21

## 2025-03-20 RX ADMIN — Medication 6 MG: at 08:03

## 2025-03-20 RX ADMIN — HYDROXYZINE PAMOATE 50 MG: 50 CAPSULE ORAL at 08:03

## 2025-03-20 RX ADMIN — ESCITALOPRAM OXALATE 10 MG: 10 TABLET ORAL at 08:03

## 2025-03-20 RX ADMIN — ARIPIPRAZOLE 2 MG: 2 TABLET ORAL at 10:03

## 2025-03-20 NOTE — PLAN OF CARE
Patient out for meals and meds only. Encouraged patient to attend groups and activities on the unit. Poor eye contact. Patient rates depression 3/10 on scale 1-10 with 10 highest level of depression and denies anxiety. Denies SI at this time. No self harming behavior displayed, no aggressive behavior displayed towards others. Patient verbally contracted for safety with staff and unit. Discussed healthy coping skills and techniques. Educated, reviewed and discussed plan of care and medication regiment with this patient 1:1. Patient voiced understanding of all teachings.

## 2025-03-20 NOTE — PROGRESS NOTES
"   03/20/25 1000   [facility] [[email or web address]] Group Therapy   Group Name Therapeutic Recreation   Specific Interventions Cognitive Stimulation Training   Participation Level Appropriate   Participation Quality Cooperative   Insight/Motivation Applies New Skills;Improved   Affect/Mood Display Depressed   Cognition Alert   Psychomotor WNL     Patient presents less depressed, participates willingly, reports a "fine" mood. Patient states "the problem I came here with are suicidal thoughts and what I want to achieve is getting rid of these thoughts completely by either doing group or occupying my mind."  "

## 2025-03-20 NOTE — PLAN OF CARE
Following POC.  Makes needs known.  Denies SI/HI,AVH.  Slow, paucity of speech.  Isolated in his room all evening.  Medication compliant.  Appetite is good.  Encouraged group attendance.  Free of fall.

## 2025-03-20 NOTE — PROGRESS NOTES
"   03/20/25 1400   [facility] Group Therapy   Group Name Therapeutic Recreation   Specific Interventions Cognitive Stimulation Training   Participation Level Appropriate   Participation Quality Cooperative   Insight/Motivation Improved;Applies New Skills   Affect/Mood Display Appropriate   Cognition Alert   Psychomotor WNL     Patient presents less depressed, participates willingly, reports a "better" mood. Patient shared taking the medicine and talking to his parents helped him to feel better. Patient states his goal "is to be more sociable and go to groups more, occupy my mind, like maybe exercise and read books." Patient states benefits of leisure activities, "to help me not think about the negative."  "

## 2025-03-20 NOTE — PLAN OF CARE
"  Problem: Adult Behavioral Health Plan of Care  Goal: Rounds/Family Conference  Outcome: Progressing  Flowsheets (Taken 3/20/2025 9574)  Participants:   psychiatrist   nursing   other ([facility] and med students)   TREATMENT TEAM      Chief Complaint:    Pt is a  19 year old  male with chief complaints of SI.  Patient reports experiencing suicidal ideation  which was triggered by hearing his parents express concerns about not feeling safe around him. He specifically recalls hearing one of them say, "What if he kills me?" on Monday. Patient then indicates that these thoughts began prior to this incident as well.        Pt Goal(s):    Pt is unsure at this time    Current Progress:   Pt attended treatment team dressed in paper scrubs.    Pt affect consistent with mood/depressed mood    Pt denies side effects to medications.    Patient reports punching a hole through a door due to his feelings of anger.    Patient states that he is unsure of what triggers these outbursts.     Patient mentions that after such episodes, he tends to feel as if nothing happens and thinks of it as a peaceful, restful state.     Patient expresses a general sense of suffering in life and states that he feels scared to go outside.     Patient notes occasional auditory hallucinations, specifically hearing his name being called from time to time.    Treatment team discussed the patient's suicidal ideation, noting that feelings of guilt and a desire to escape appear to be driving these thoughts.    Patient's symptoms have shown minimal change since yesterday/admission and depression and anxiety remain severe.   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      Program/groups:  Encourage pt to continue to attend all groups. Pt isolating in room, but lacks of interest in groups.         Revisions to Plan:  Encourage pt to attend all groups. Recommendations are for pt to attend medication " management and psychotherapy.   MEDICATIONS:  start lexapro 10 mg PO qd  start adjunctive abilify 2 mg po q day

## 2025-03-20 NOTE — PSYCH
[facility] discussed with pt on collateral consent. Pt signed collateral consent for  Evelyn Rodriguez/mother [phone]. [facility] attemtped to contact collateral consent to discuss pt admission. Mother stated:        Reason for admission- describe what happened?    [place] [employer].He has been depressed for awhile and I have been trying to get him help and they told me I could not do anything due to he was 17 years old and he would have to make the appointments and now here we are. When I got off of work I noticed he was not here and he never goes any wheres and that is what triggered me to call for help.         Prior treatment places and dates-doctor name and location  Reason for prior treatment- same or different  How long has patient had problem(s)?  I know that when he was 6-7 he was on adderral for adhd, but nothing else. There were other episodes to where he ran any and we found him at an abandoned house.     Substance abuse- what , how long, how much, how often?    Not to my knowledge  Legal Issues- Current charges, type of offense, probation or parole?   Not to my knowledge.    History of suicide attempts- when and what methods, did they require medical attention?   He ran away before, but this is the only time that I know of.      Alcohol Use-  What preference of alcohol, how much, how long, how often?  Not to my knowledge    History of violence-describe behaviors and triggers    No he is not violent at all.He did punch a hole in his wall but never hit anyone.    Any guns or weapons in the home? If yes, recommend that these be secured.    Not to my knowledge    What is patients baseline behavior/mood- how well do they know if patient is doing well?    When he is playing his video games.    What helps the patient stay well?  Internal/external coping strategies ( attending meetings, going to groups, taking medications, spending time with family ( etc)?    When he  is around his family    Discharge plan:    Where will the patient live upon discharge?  He will come back to live with us.    Who else in the home?    Just myself and his step father    Will someone be able to pick the patient up when discharged?     I will come to pick him up upon discharge.

## 2025-03-20 NOTE — PROGRESS NOTES
"   03/20/25 1146   Assessment   Patient's Identification of the Problem Patient presents cooperative, reports a "fine" mood. Patient reports his admit is due to depression, suicidal thoughts, arguing with my parents." Patient denies substance use. Patient reports he is single, does not have children, is a [employer], is unemployed, wears glasses, lives in [place] with his parents. Patient verbalized his main goal, better coping skills.   Leisure Interest Watching TV;Movies;Exercise;Reading;Listen to Music;Video Games;Enjoy Family/Friends   Leisure Barriers Loss of Interest;Lack of Finances;Energy Level;Self Confidence;Communication   Treatment Focus To Improve Mood;To Increase Motivation;Increase Self Confidence;To Improve Leisure Awareness/Lifestyle/Interest;To Promote Successful and Safe Self Expression;To Improve Coping Skills;To Increase Energy Level       Treatment Recommendation:  1:1 Intervention (as needed)    Cognitive Stimulation Skilled Activity  Self Expression Skilled Activity  Mild Exercises Skilled Activity  Coping Skilled Activity  Leisure Education and Awareness Skilled Activity    Treatment Goal(s):  Long Term Goals Refer To Master Treatment Plan    Short Term Treatment Goal(s)  Patient Will:  Comply with Treatment  Exhibit Improvement in Mood  Demonstrate Constructive Expression of Feelings and Behavior  Verbalize Improvement in Mood  Identify at Least 2 Coping Skills or Leisure Skills to Reduce Depression and Hopelessness Upon Request from Therapist    Discharge Recommendations:  Encourage Patient to Actively Utilize Available Community Resources to Increase Leisure Involvement to Decrease Signs and Symptoms of Illness  Encourage Patient to Utilize Coping Skills on a Regular Basis to Reduce the Risk of Decompensating and Re-Hospitalizations  Support Group  Follow Up with After Care Appointments  Continue with Current Leisure Activities  "

## 2025-03-20 NOTE — PROGRESS NOTES
PSYCHIATRY DAILY INPATIENT PROGRESS NOTE  SUBSEQUENT HOSPITAL VISIT    ENCOUNTER DATE: 3/20/2025  SITE: [facility]    DATE OF ADMISSION: 3/18/2025  4:24 AM  LENGTH OF STAY: 2 days      CHIEF COMPLAINT   Lam Rodriguez is a 19 y.o. male, seen during daily ng rounds on the inpatient unit.  Lam Rodriguez presented with the chief complaint of depression and anxiety    The patient was seen and examined. The chart was reviewed.     Reviewed notes from Rns, MD, CTRS, RD, SW, and LPC and labs from the last 24 hours.    The patient's case was discussed with the treatment team/care providers today including Rns and LPC    Staff reports no behavioral or management issues.     The patient has been compliant with treatment.      Subjective 03/20/2025       Yesterday the patient reports continued depression. Behavior is withdrawn and isolative. Pt states he has not spoken to his family. Encouraged pt to participate in groups. Provided motivational interviewing, pt agrees to attempt to be more active.    Today, he reports that he feels about the same. We discussed SI and feelings of escape and guilt seem to drive the SI.   Symptoms persists minimally changed since yesterday/admission. Depression and anxiety remain severe.  He continues to isolate. Severe PMR again noted    The patient denies any side effects to medications.    At this time symptoms remains severe, functionally and behaviorally impairing and pt remains in need of continued acute inpatient hospitalization for both safety and stabilization.               Psychiatric ROS (observed, reported, or endorsed/denied):  Depressed mood - Continuing  Interest/pleasure/anhedonia: Continuing  Guilt/hopelessness/worthlessness - Continuing  Changes in Sleep - less  Changes in Appetite - Continuing  Changes in Concentration - Continuing  Changes in Energy - Continuing  PMA/R- Continuing  Suicidal- active/passive ideations - less  Homicidal ideations: active/passive  ideations - No    Hallucinations - No  Delusions - No  Disorganized behavior - No  Disorganized speech - No  Negative symptoms - No    Elevated mood - No  Decreased need for sleep - No  Grandiosity - No  Racing thoughts - No  Impulsivity - No  Irritability- No  Increased energy - No  Distractibility - No  Increase in goal-directed activity or PMA- No    Symptoms of OLIVIA - fluctuating  Symptoms of Panic Disorder- No  Symptoms of PTSD - No  Symptoms of OCD- less      Overall progress:  minimal        Psychotherapy:  Target symptoms: depression, anxiety   Why chosen therapy is appropriate versus another modality: relevant to diagnosis  Outcome monitoring methods: self-report  Therapeutic intervention type: supportive psychotherapy  Topics discussed/themes: building skills sets for symptom management, symptom recognition  The patient's response to the intervention is accepting. The patient's progress toward treatment goals is limited.   Duration of intervention: 16 minutes.        Medical ROS  Review of Systems   Constitutional:  Negative for chills and fever.   HENT:  Negative for hearing loss.    Eyes:  Negative for blurred vision and double vision.   Respiratory:  Negative for shortness of breath.    Cardiovascular:  Negative for chest pain and palpitations.   Gastrointestinal:  Negative for constipation, diarrhea, nausea and vomiting.   Genitourinary:  Negative for dysuria.   Musculoskeletal:  Negative for back pain and neck pain.   Skin:  Negative for rash.   Neurological:  Negative for dizziness and headaches.   Endo/Heme/Allergies:  Negative for environmental allergies.         PAST MEDICAL HISTORY   Past Medical History:   Diagnosis Date    Asthma     Depression     Pt endorses  symptoms of dfepression since age 14.    Psychiatric problem     Depressed mood; Suicidal Ideation           PSYCHOTROPIC MEDICATIONS   Scheduled Meds:   EScitalopram oxalate  10 mg Oral Daily     Continuous Infusions:  PRN  Meds:.  Current Facility-Administered Medications:     hydrOXYzine pamoate, 50 mg, Oral, Q6H PRN    melatonin, 6 mg, Oral, Nightly PRN    nicotine, 1 patch, Transdermal, Daily PRN    OLANZapine, 10 mg, Oral, Q8H PRN **AND** OLANZapine, 10 mg, Intramuscular, Q8H PRN        EXAMINATION    VITALS   Vitals:    03/19/25 0729 03/19/25 0800 03/19/25 1911 03/20/25 0712   BP: 122/65  137/72 (!) 100/59   BP Location:   Left arm Left arm   Patient Position: Sitting  Sitting Lying   Pulse: 75  89 83   Resp: 20  18 18   Temp: 98.2 °F (36.8 °C)  96.9 °F (36.1 °C) 98.5 °F (36.9 °C)   TempSrc: Tympanic  Temporal Tympanic   SpO2: 99%  98% 98%   Weight:  103.9 kg (229 lb 2.7 oz)     Height:           Body mass index is 31.08 kg/m².        CONSTITUTIONAL  General Appearance: unremarkable, age appropriate    MUSCULOSKELETAL  Muscle Strength and Tone:no tremor, no tic  Abnormal Involuntary Movements: No  Gait and Station: non-ataxic    PSYCHIATRIC   Level of Consciousness: awake and alert   Orientation: person, place, and situation  Grooming: Hospital garb and Well groomed  Psychomotor Behavior: psychomotor retardation  Speech: soft, non-spontaneous, monotone  Language: grossly intact  Mood: depressed  Affect: Consistent with mood  Thought Process: linear, logical  Associations: intact   Thought Content: +SI, denies HI, and no delusions  Perceptions: denies AH and denies  VH  Memory: Able to recall past events, Remote intact, and Recent intact  Attention:Attends to interview without distraction  Fund of Knowledge: Aware of current events and Vocabulary appropriate   Estimate if Intelligence:  Average based on work/education history, vocabulary and mental status exam  Insight: has awareness of illness  Judgment: behavior is adequate to circumstances        DIAGNOSTIC TESTING   Laboratory Results  No results found for this or any previous visit (from the past 24 hours).        MEDICAL DECISION MAKING          ASSESSMENT      MDD,  recurrent, severe  OCD  Social anxiety    Psychosocial stressors    Obesity        PROBLEM LIST AND MANAGEMENT PLANS    MDD, recurrent, severe  - start lexapro 10 mg PO qd  -start adjunctive abilify 2 mg po q day  - pt counseled  - follow up with outpatient mental health providers after discharge for medication management and psychotherapy     OCD  - start lexapro 10 mg PO qd  - pt counseled  - follow up with outpatient mental health providers after discharge for medication management and psychotherapy    Social anxiety  - start lexapro 10 mg PO qd  - pt counseled  - follow up with outpatient mental health providers after discharge for medication management and psychotherapy        Psychosocial stressors  - pt counseled  - SW consulted for assistance with additional resources          Discussed diagnosis, risks and benefits of proposed treatment vs alternative treatments vs no treatment, potential side effects of these treatments and the inherent unpredictability of treatment. The patient expresses understanding of the above and displays the capacity to agree with this treatment given said understanding. Patient also agrees that, currently, the benefits outweigh the risks and would like to pursue/continue treatment at this time.    Any medications being used off-label were discussed with the patient inclusive of the evidence base for the use of the medications and consent was obtained for the off-label use of the medication.       DISCHARGE PLANNING  Expected Disposition Plan: Home or Self Care      NEED FOR CONTINUED HOSPITALIZATION  Psychiatric illness continues to pose a potential threat to life or bodily function, of self or others, thereby requiring the need for continued inpatient psychiatric hospitalization: Yes, due to: danger to self, as evidenced by:  Concerns with SI--Fading.    Protective inpatient pyschiatric hospitalization required while a safe disposition plan is enacted: Yes    Patient stabilized  and ready for discharge from inpatient psychiatric unit: No        STAFF:   Carl Newton MD  Psychiatry

## 2025-03-21 PROCEDURE — 99233 SBSQ HOSP IP/OBS HIGH 50: CPT | Mod: ,,, | Performed by: STUDENT IN AN ORGANIZED HEALTH CARE EDUCATION/TRAINING PROGRAM

## 2025-03-21 PROCEDURE — 25000003 PHARM REV CODE 250: Performed by: PSYCHIATRY & NEUROLOGY

## 2025-03-21 PROCEDURE — 25000003 PHARM REV CODE 250: Performed by: STUDENT IN AN ORGANIZED HEALTH CARE EDUCATION/TRAINING PROGRAM

## 2025-03-21 PROCEDURE — 90833 PSYTX W PT W E/M 30 MIN: CPT | Mod: ,,, | Performed by: STUDENT IN AN ORGANIZED HEALTH CARE EDUCATION/TRAINING PROGRAM

## 2025-03-21 PROCEDURE — 11400000 HC PSYCH PRIVATE ROOM

## 2025-03-21 RX ORDER — ARIPIPRAZOLE 5 MG/1
5 TABLET ORAL DAILY
Status: DISCONTINUED | OUTPATIENT
Start: 2025-03-22 | End: 2025-03-25 | Stop reason: HOSPADM

## 2025-03-21 RX ADMIN — HYDROXYZINE PAMOATE 50 MG: 50 CAPSULE ORAL at 08:03

## 2025-03-21 RX ADMIN — ARIPIPRAZOLE 2 MG: 2 TABLET ORAL at 08:03

## 2025-03-21 RX ADMIN — Medication 6 MG: at 08:03

## 2025-03-21 RX ADMIN — ESCITALOPRAM OXALATE 10 MG: 10 TABLET ORAL at 08:03

## 2025-03-21 NOTE — PLAN OF CARE
Following POC.  Makes needs known.  Denies SI/HI, AVH.  States he's ok.  Insolated in his room all evening.  Declined a shower.  Declined a snack.  Appetite is ok.  Medication compliance.  Encouraged out of room activities.  Free of fall.

## 2025-03-21 NOTE — PLAN OF CARE
Problem: Adult Behavioral Health Plan of Care  Goal: Optimized Coping Skills in Response to Life Stressors  Outcome: Progressing  Intervention: Promote Effective Coping Strategies  Flowsheets (Taken 3/20/2025 0124)  Supportive Measures:   active listening utilized   counseling provided   goal-setting facilitated  PROCESS GROUP:  Behavior:  Pt presents less depressed and participated in group. Patient participated in a goal  setting exercise, discussing both short-term and long-term objectives related to their  treatment.   Intervention:   PLPC guided the patient in breaking down their goals into manageable steps  and encouraged the patient to set realistic, measurable targets. PLPC  provided strategies to track progress and ways to maintain motivation.Patient  identified areas of focus, such as improving social interactions and  managing stress more effectively.    Response:  Pt states his goal is to be more communicative with his parents and the  people around him     Plan:   Patient will work on setting specific action plans related to their goals for  the upcoming week. PLPC will review progress and adjust the goals as  needed in future session.

## 2025-03-21 NOTE — PLAN OF CARE
Patient awake and alert. Isolated in room with minimal interaction with staff and peers. Withdrawn and restricted. Quiet and guarded. Hesitant to make eye contact. Reports he feels okay and slept well last night. Denies anxiety and depression. Denies SI/HI/Hallucinations. Medications adjusted, reviewed, and accepted. Plan of care ongoing.

## 2025-03-21 NOTE — PLAN OF CARE
Problem: Depressive Signs/Symptoms  Goal: Enhanced Self-Esteem and Confidence (Depressive Signs/Symptoms)  Outcome: Progressing  Intervention: Promote Confidence and Self-Esteem  Flowsheets (Taken 3/21/2025 1192)  Supportive Measures:   active listening utilized   counseling provided   positive reinforcement provided   verbalization of feelings encouraged   Process Group:    Behavior:   Pt presents cooperative and  calm mood.  Intervention:   PLPC validated the clients feelings by acknowledging their anxiety as a normal reaction. Positive reinforcement was provided by praising the client for their openness and the effort to confront their fears. PLPC also encouraged the patient to verbally express how patient feels when anxiety arises and discussed specific coping techniques (e.g., deep breathing, positive affirmations).  Response:   Pt states he is feeling fine and has been reading the packet he was given when he came in to keep busy.  Plan: Patient will continue practicing verbalizing feelings when experiencing anxiety. PLPC will encourage the client to keep a journal of emotions and coping strategies used throughout the  stay. Pt will focus on reviewing the  their progress with these techniques and reinforcing their continued efforts of staying positive.

## 2025-03-21 NOTE — PROGRESS NOTES
PSYCHIATRY DAILY INPATIENT PROGRESS NOTE  SUBSEQUENT HOSPITAL VISIT    ENCOUNTER DATE: 3/21/2025  SITE: [facility]    DATE OF ADMISSION: 3/18/2025  4:24 AM  LENGTH OF STAY: 3 days      CHIEF COMPLAINT   Lam Rodriguez is a 19 y.o. male, seen during daily ng rounds on the inpatient unit.  Lam Rodriguez presented with the chief complaint of depression and anxiety    The patient was seen and examined. The chart was reviewed.     Reviewed notes from Rns, CTRS, and SW and labs from the last 24 hours.    The patient's case was discussed with the treatment team/care providers today including Rns    Staff reports no behavioral or management issues.     The patient has been compliant with treatment.      Subjective 03/21/2025       Today the patient reports he had a supportive conversation with his father which was helpful.    He is attending groups which he states are beneficial. He does feel that medications are helping.    Behavior isolative and withdrawn at times.     Explored his goals for the future, reports he has an upcoming interview at [employer] [facility] that he would like to attend, reports goals are living independently and being financially secure.    The patient denies any side effects to medications.    At this time symptoms remains severe, functionally and behaviorally impairing and pt remains in need of continued acute inpatient hospitalization for both safety and stabilization.         Per RN:  Following POC.  Makes needs known.  Denies SI/HI, AVH.  States he's ok.  Insolated in his room all evening.  Declined a shower.  Declined a snack.      Psychiatric ROS (observed, reported, or endorsed/denied):  Depressed mood - less  Interest/pleasure/anhedonia: fluctuating  Guilt/hopelessness/worthlessness - Continuing  Changes in Sleep - less  Changes in Appetite - less  Changes in Concentration - less  Changes in Energy - fluctuating  PMA/R- fluctuating  Suicidal- active/passive ideations - less  Homicidal  ideations: active/passive ideations - No    Hallucinations - No  Delusions - No  Disorganized behavior - No  Disorganized speech - No  Negative symptoms - No    Elevated mood - No  Decreased need for sleep - No  Grandiosity - No  Racing thoughts - No  Impulsivity - No  Irritability- No  Increased energy - No  Distractibility - No  Increase in goal-directed activity or PMA- No    Symptoms of OLIVIA - fluctuating  Symptoms of Panic Disorder- No  Symptoms of PTSD - No  Symptoms of OCD- less      Overall progress:  minimal        Psychotherapy:  Target symptoms: depression, anxiety   Why chosen therapy is appropriate versus another modality: relevant to diagnosis  Outcome monitoring methods: self-report  Therapeutic intervention type: supportive psychotherapy  Topics discussed/themes: building skills sets for symptom management, symptom recognition  The patient's response to the intervention is accepting. The patient's progress toward treatment goals is limited.   Duration of intervention: 16 minutes.        Medical ROS  Review of Systems   Constitutional:  Negative for chills and fever.   HENT:  Negative for hearing loss.    Eyes:  Negative for blurred vision and double vision.   Respiratory:  Negative for shortness of breath.    Cardiovascular:  Negative for chest pain and palpitations.   Gastrointestinal:  Negative for constipation, diarrhea, nausea and vomiting.   Genitourinary:  Negative for dysuria.   Musculoskeletal:  Negative for back pain and neck pain.   Skin:  Negative for rash.   Neurological:  Negative for dizziness and headaches.   Endo/Heme/Allergies:  Negative for environmental allergies.         PAST MEDICAL HISTORY   Past Medical History:   Diagnosis Date    Asthma     Depression     Pt endorses  symptoms of dfepression since age 14.    Psychiatric problem     Depressed mood; Suicidal Ideation           PSYCHOTROPIC MEDICATIONS   Scheduled Meds:   ARIPiprazole  2 mg Oral Daily    EScitalopram oxalate  10  mg Oral Daily     Continuous Infusions:  PRN Meds:.  Current Facility-Administered Medications:     hydrOXYzine pamoate, 50 mg, Oral, Q6H PRN    melatonin, 6 mg, Oral, Nightly PRN    nicotine, 1 patch, Transdermal, Daily PRN    OLANZapine, 10 mg, Oral, Q8H PRN **AND** OLANZapine, 10 mg, Intramuscular, Q8H PRN        EXAMINATION    VITALS   Vitals:    03/19/25 1911 03/20/25 0712 03/20/25 1923 03/21/25 0737   BP: 137/72 (!) 100/59 136/82 (!) 104/53   BP Location: Left arm Left arm Left arm Left arm   Patient Position: Sitting Lying Sitting Lying   Pulse: 89 83 85 81   Resp: 18 18 20 16   Temp: 96.9 °F (36.1 °C) 98.5 °F (36.9 °C) 98.5 °F (36.9 °C) 97.1 °F (36.2 °C)   TempSrc: Temporal Tympanic Temporal Tympanic   SpO2: 98% 98% 97% 97%   Weight:       Height:           Body mass index is 31.08 kg/m².        CONSTITUTIONAL  General Appearance: unremarkable, age appropriate    MUSCULOSKELETAL  Muscle Strength and Tone:no tremor, no tic  Abnormal Involuntary Movements: No  Gait and Station: non-ataxic    PSYCHIATRIC   Level of Consciousness: awake and alert   Orientation: person, place, and situation  Grooming: Hospital garb and Well groomed  Psychomotor Behavior: psychomotor retardation  Speech: soft, non-spontaneous, monotone  Language: grossly intact  Mood: ok  Affect: Consistent with mood  Thought Process: linear, logical  Associations: intact   Thought Content: less SI, denies HI, and no delusions  Perceptions: denies AH and denies  VH  Memory: Able to recall past events, Remote intact, and Recent intact  Attention:Attends to interview without distraction  Fund of Knowledge: Aware of current events and Vocabulary appropriate   Estimate if Intelligence:  Average based on work/education history, vocabulary and mental status exam  Insight: has awareness of illness  Judgment: behavior is adequate to circumstances        DIAGNOSTIC TESTING   Laboratory Results  No results found for this or any previous visit (from the past  24 hours).        MEDICAL DECISION MAKING          ASSESSMENT      MDD, recurrent, severe  OCD  Social anxiety    Psychosocial stressors    Obesity        PROBLEM LIST AND MANAGEMENT PLANS        MDD, recurrent, severe  - started/continue lexapro 10 mg PO qd  - started/continue adjunctive abilify 2 mg po q day  -increase to 5 mg PO qd tomorrow  - pt counseled  - follow up with outpatient mental health providers after discharge for medication management and psychotherapy     OCD  - started/continue lexapro 10 mg PO qd  - pt counseled  - follow up with outpatient mental health providers after discharge for medication management and psychotherapy    Social anxiety  - started/continue lexapro 10 mg PO qd  - pt counseled  - follow up with outpatient mental health providers after discharge for medication management and psychotherapy        Psychosocial stressors  - pt counseled  - SW consulted for assistance with additional resources          Discussed diagnosis, risks and benefits of proposed treatment vs alternative treatments vs no treatment, potential side effects of these treatments and the inherent unpredictability of treatment. The patient expresses understanding of the above and displays the capacity to agree with this treatment given said understanding. Patient also agrees that, currently, the benefits outweigh the risks and would like to pursue/continue treatment at this time.    Any medications being used off-label were discussed with the patient inclusive of the evidence base for the use of the medications and consent was obtained for the off-label use of the medication.       DISCHARGE PLANNING  Expected Disposition Plan: Home or Self Care      NEED FOR CONTINUED HOSPITALIZATION  Psychiatric illness continues to pose a potential threat to life or bodily function, of self or others, thereby requiring the need for continued inpatient psychiatric hospitalization: Yes, due to: danger to self, as evidenced by:   Concerns with SI--Fading.    Protective inpatient pyschiatric hospitalization required while a safe disposition plan is enacted: Yes    Patient stabilized and ready for discharge from inpatient psychiatric unit: No        STAFF:   Ji Romeo III, MD  Psychiatry

## 2025-03-21 NOTE — PROGRESS NOTES
"   03/21/25 1000   [facility] Group Therapy   Group Name Therapeutic Recreation   Specific Interventions Cognitive Stimulation Training   Participation Level Appropriate   Participation Quality Cooperative   Insight/Motivation Improved   Affect/Mood Display Appropriate   Cognition Alert   Psychomotor WNL     Patient presents calm, cooperative, reports an "alright" mood. Patient verbalized that he has been exercising in his room, doing push-ups to help, and reading the skilled worksheets to help take his mind off things.  "

## 2025-03-21 NOTE — MEDICAL/APP STUDENT
See staff notes    Review of Systems   Constitutional:  Negative for chills and fever.   HENT: Negative.     Eyes:  Negative for blurred vision and double vision.   Respiratory:  Negative for cough, shortness of breath and wheezing.    Cardiovascular:  Negative for chest pain and palpitations.   Gastrointestinal:  Negative for constipation, diarrhea, nausea and vomiting.   Genitourinary: Negative.    Musculoskeletal: Negative.    Skin: Negative.    Neurological: Negative.    Endo/Heme/Allergies: Negative.    Psychiatric/Behavioral:  Positive for depression and suicidal ideas.

## 2025-03-22 ENCOUNTER — HOSPITAL ENCOUNTER (EMERGENCY)
Facility: HOSPITAL | Age: 20
Discharge: HOME | End: 2025-03-22
Attending: SURGERY
Payer: MEDICAID

## 2025-03-22 VITALS
WEIGHT: 229.06 LBS | HEIGHT: 72 IN | TEMPERATURE: 99 F | HEART RATE: 67 BPM | DIASTOLIC BLOOD PRESSURE: 67 MMHG | OXYGEN SATURATION: 98 % | BODY MASS INDEX: 31.03 KG/M2 | SYSTOLIC BLOOD PRESSURE: 121 MMHG | RESPIRATION RATE: 18 BRPM

## 2025-03-22 DIAGNOSIS — R55 NEAR SYNCOPE: ICD-10-CM

## 2025-03-22 DIAGNOSIS — I95.1 POSTURAL HYPOTENSION: Primary | ICD-10-CM

## 2025-03-22 DIAGNOSIS — Z00.8 MEDICAL CLEARANCE FOR PSYCHIATRIC ADMISSION: ICD-10-CM

## 2025-03-22 LAB
ABSOLUTE EOSINOPHIL (OHS): 0.13 K/UL
ABSOLUTE MONOCYTE (OHS): 0.6 K/UL (ref 0.3–1)
ABSOLUTE NEUTROPHIL COUNT (OHS): 3.3 K/UL (ref 1.8–7.7)
ALBUMIN SERPL BCP-MCNC: 4.5 G/DL (ref 3.5–5.2)
ALP SERPL-CCNC: 71 UNIT/L (ref 40–150)
ALT SERPL W/O P-5'-P-CCNC: 16 UNIT/L (ref 10–44)
ANION GAP (OHS): 12 MMOL/L (ref 8–16)
AST SERPL-CCNC: 19 UNIT/L (ref 11–45)
BACTERIA #/AREA URNS HPF: ABNORMAL /HPF
BASOPHILS # BLD AUTO: 0.12 K/UL
BASOPHILS NFR BLD AUTO: 1.6 %
BILIRUB SERPL-MCNC: 0.8 MG/DL (ref 0.1–1)
BILIRUB UR QL STRIP.AUTO: NEGATIVE
BUN SERPL-MCNC: 16 MG/DL (ref 6–20)
CALCIUM SERPL-MCNC: 9.5 MG/DL (ref 8.7–10.5)
CHLORIDE SERPL-SCNC: 106 MMOL/L (ref 95–110)
CK SERPL-CCNC: 132 U/L (ref 20–200)
CLARITY UR: CLEAR
CO2 SERPL-SCNC: 22 MMOL/L (ref 23–29)
COLOR UR AUTO: YELLOW
CREAT SERPL-MCNC: 1.1 MG/DL (ref 0.5–1.4)
ERYTHROCYTE [DISTWIDTH] IN BLOOD BY AUTOMATED COUNT: 11.9 % (ref 11.5–14.5)
GFR SERPLBLD CREATININE-BSD FMLA CKD-EPI: >60 ML/MIN/1.73/M2
GLUCOSE SERPL-MCNC: 109 MG/DL (ref 70–110)
GLUCOSE UR QL STRIP: NEGATIVE
HCT VFR BLD AUTO: 43.2 % (ref 40–54)
HGB BLD-MCNC: 15.3 GM/DL (ref 14–18)
HGB UR QL STRIP: NEGATIVE
HOLD SPECIMEN: NORMAL
HYALINE CASTS #/AREA URNS LPF: 2 /LPF (ref 0–1)
IMM GRANULOCYTES # BLD AUTO: 0.05 K/UL (ref 0–0.04)
IMM GRANULOCYTES NFR BLD AUTO: 0.7 % (ref 0–0.5)
KETONES UR QL STRIP: NEGATIVE
LEUKOCYTE ESTERASE UR QL STRIP: NEGATIVE
LYMPHOCYTES # BLD AUTO: 3.28 K/UL (ref 1–4.8)
MAGNESIUM SERPL-MCNC: 1.9 MG/DL (ref 1.6–2.6)
MCH RBC QN AUTO: 29.4 PG (ref 27–50)
MCHC RBC AUTO-ENTMCNC: 35.4 G/DL (ref 32–36)
MCV RBC AUTO: 83 FL (ref 82–98)
MICROSCOPIC COMMENT: ABNORMAL
NITRITE UR QL STRIP: NEGATIVE
NUCLEATED RBC (/100WBC) (OHS): 0 /100 WBC
PH UR STRIP: 7 [PH]
PLATELET # BLD AUTO: 227 K/UL (ref 150–450)
PMV BLD AUTO: 9.2 FL (ref 9.2–12.9)
POCT GLUCOSE: 100 MG/DL (ref 70–110)
POCT GLUCOSE: 106 MG/DL (ref 70–110)
POTASSIUM SERPL-SCNC: 3.9 MMOL/L (ref 3.5–5.1)
PROT SERPL-MCNC: 8 GM/DL (ref 6–8.4)
PROT UR QL STRIP: ABNORMAL
RBC # BLD AUTO: 5.2 M/UL (ref 4.6–6.2)
RBC #/AREA URNS HPF: 3 /HPF (ref 0–4)
RELATIVE EOSINOPHIL (OHS): 1.7 %
RELATIVE LYMPHOCYTE (OHS): 43.9 % (ref 18–48)
RELATIVE MONOCYTE (OHS): 8 % (ref 4–15)
RELATIVE NEUTROPHIL (OHS): 44.1 % (ref 38–73)
SODIUM SERPL-SCNC: 140 MMOL/L (ref 136–145)
SP GR UR STRIP: 1.02
SQUAMOUS #/AREA URNS HPF: 2 /HPF
TROPONIN I SERPL DL<=0.01 NG/ML-MCNC: <0.006 NG/ML
UROBILINOGEN UR STRIP-ACNC: 1 EU/DL
WBC # BLD AUTO: 7.48 K/UL (ref 3.9–12.7)
WBC #/AREA URNS HPF: 0 /HPF (ref 0–5)

## 2025-03-22 PROCEDURE — 93005 ELECTROCARDIOGRAM TRACING: CPT

## 2025-03-22 PROCEDURE — 11400000 HC PSYCH PRIVATE ROOM

## 2025-03-22 PROCEDURE — 99232 SBSQ HOSP IP/OBS MODERATE 35: CPT | Mod: ,,, | Performed by: PSYCHIATRY & NEUROLOGY

## 2025-03-22 PROCEDURE — 25000003 PHARM REV CODE 250: Performed by: NURSE PRACTITIONER

## 2025-03-22 PROCEDURE — 25000003 PHARM REV CODE 250: Performed by: STUDENT IN AN ORGANIZED HEALTH CARE EDUCATION/TRAINING PROGRAM

## 2025-03-22 PROCEDURE — 99284 EMERGENCY DEPT VISIT MOD MDM: CPT | Mod: 25

## 2025-03-22 PROCEDURE — 84484 ASSAY OF TROPONIN QUANT: CPT | Performed by: NURSE PRACTITIONER

## 2025-03-22 PROCEDURE — 82962 GLUCOSE BLOOD TEST: CPT

## 2025-03-22 PROCEDURE — 82550 ASSAY OF CK (CPK): CPT | Performed by: NURSE PRACTITIONER

## 2025-03-22 PROCEDURE — 83735 ASSAY OF MAGNESIUM: CPT | Performed by: NURSE PRACTITIONER

## 2025-03-22 PROCEDURE — 99900035 HC TECH TIME PER 15 MIN (STAT)

## 2025-03-22 PROCEDURE — 96360 HYDRATION IV INFUSION INIT: CPT

## 2025-03-22 PROCEDURE — 93010 ELECTROCARDIOGRAM REPORT: CPT | Mod: ,,, | Performed by: INTERNAL MEDICINE

## 2025-03-22 PROCEDURE — 25000003 PHARM REV CODE 250: Performed by: PSYCHIATRY & NEUROLOGY

## 2025-03-22 PROCEDURE — 85025 COMPLETE CBC W/AUTO DIFF WBC: CPT | Performed by: NURSE PRACTITIONER

## 2025-03-22 PROCEDURE — 81000 URINALYSIS NONAUTO W/SCOPE: CPT | Performed by: NURSE PRACTITIONER

## 2025-03-22 PROCEDURE — 80053 COMPREHEN METABOLIC PANEL: CPT | Performed by: NURSE PRACTITIONER

## 2025-03-22 RX ORDER — SODIUM CHLORIDE 9 MG/ML
1000 INJECTION, SOLUTION INTRAVENOUS
Status: DISCONTINUED | OUTPATIENT
Start: 2025-03-22 | End: 2025-03-22

## 2025-03-22 RX ORDER — SODIUM CHLORIDE 9 MG/ML
1000 INJECTION, SOLUTION INTRAVENOUS
Status: COMPLETED | OUTPATIENT
Start: 2025-03-22 | End: 2025-03-22

## 2025-03-22 RX ADMIN — SODIUM CHLORIDE 1000 ML: 9 INJECTION, SOLUTION INTRAVENOUS at 12:03

## 2025-03-22 RX ADMIN — ESCITALOPRAM OXALATE 10 MG: 10 TABLET ORAL at 07:03

## 2025-03-22 RX ADMIN — HYDROXYZINE PAMOATE 50 MG: 50 CAPSULE ORAL at 07:03

## 2025-03-22 RX ADMIN — ARIPIPRAZOLE 5 MG: 5 TABLET ORAL at 07:03

## 2025-03-22 NOTE — PROVIDER PROGRESS NOTES - EMERGENCY DEPT.
Encounter Date: 3/22/2025    ED Physician Progress Notes        I examined the patient on psychiatric unit approximately 1130 after a rapid response was called the patient is a young man who had a near syncopal episode on the floor after his talk on the phone he felt weak and slid against the wall and did not suffer any injuries when he sat down his blood pressure was 7 8/42 repeat blood pressure was 90/70 patient did not receive any medication but 1 Abilify pill today he is on a regular diet but does not eat much my impression the patient had postural hypotension and secondary vasovagal syncope and will get treated in the ED and sent back to the floor after evaluation

## 2025-03-22 NOTE — ED PROVIDER NOTES
Encounter Date: 3/22/2025       History     Chief Complaint   Patient presents with    Fatigue     Pt arrived to the ED c/o weakness with a fall and being cold/clammy while he was on the phone in the U. Pt remembers the fall and denies loss of consciousness. Pt denied hitting head. U reports pt's initial systolic pressure was in the 70s. .      Lam Rodriguez is a 19 y.o. male with PMH of anxiety and depression presenting to the ED for vasovagal syncope. Patient is currently admitted to the behavioral health unit. He reports becoming weak, cold/clammy while talking on the phone. He was leaning against a wall and his arm hit the wall and his body slid down the wall onto the floor. There was no head trauma or reported injuries. He did not lose consciousness. There was no preceding CP, SOB or palpitations. His BP was noted to be 70s systolic and rapid response was called.     The history is provided by the patient.     Review of patient's allergies indicates:   Allergen Reactions    Shellfish containing products Hives, Itching and Edema     Past Medical History:   Diagnosis Date    Asthma     Depression     Pt endorses  symptoms of dfepression since age 14.    Psychiatric problem     Depressed mood; Suicidal Ideation     History reviewed. No pertinent surgical history.  Family History   Problem Relation Name Age of Onset    No Known Problems Mother Evelyn Kay     Schizophrenia Father      Bipolar disorder Father      No Known Problems Parent Step-dad      Social History[1]  Review of Systems   Constitutional:  Negative for appetite change, chills and fever.   HENT:  Negative for congestion, ear discharge, ear pain, postnasal drip, rhinorrhea and sore throat.    Respiratory:  Negative for cough, chest tightness and shortness of breath.    Cardiovascular:  Negative for chest pain.   Gastrointestinal:  Negative for abdominal distention, abdominal pain and nausea.   Genitourinary:  Negative for dysuria,  flank pain, hematuria and urgency.   Musculoskeletal:  Negative for arthralgias and back pain.   Skin:  Negative for rash.   Neurological:  Negative for dizziness, weakness, numbness and headaches.   Hematological:  Does not bruise/bleed easily.       Physical Exam     Initial Vitals   BP Pulse Resp Temp SpO2   03/22/25 1155 03/22/25 1200 03/22/25 1200 03/22/25 1215 03/22/25 1215   (!) 71/42 (!) 57 16 98.4 °F (36.9 °C) 98 %      MAP       --                Physical Exam    Nursing note and vitals reviewed.  Constitutional: He appears well-developed and well-nourished.   HENT:   Head: Normocephalic and atraumatic.   Eyes: Conjunctivae and EOM are normal. Pupils are equal, round, and reactive to light.   Neck: Neck supple.   Cardiovascular:  Normal rate, regular rhythm, normal heart sounds and intact distal pulses.           Pulmonary/Chest: Breath sounds normal.   Abdominal: Abdomen is soft. Bowel sounds are normal.   Musculoskeletal:         General: Normal range of motion.      Cervical back: Neck supple.     Neurological: He is alert and oriented to person, place, and time. He has normal strength.   Skin: Skin is warm and dry.   Psychiatric: He has a normal mood and affect. His behavior is normal. Judgment and thought content normal.         ED Course   Procedures  Labs Reviewed   COMPREHENSIVE METABOLIC PANEL - Abnormal       Result Value    Sodium 140      Potassium 3.9      Chloride 106      CO2 22 (*)     Glucose 109      BUN 16      Creatinine 1.1      Calcium 9.5      Protein Total 8.0      Albumin 4.5      Bilirubin Total 0.8      ALP 71      AST 19      ALT 16      Anion Gap 12      eGFR >60     CBC WITH DIFFERENTIAL - Abnormal    WBC 7.48      RBC 5.20      HGB 15.3      HCT 43.2      MCV 83      MCH 29.4      MCHC 35.4      RDW 11.9      Platelet Count 227      MPV 9.2      Nucleated RBC 0      Neut % 44.1      Lymph % 43.9      Mono % 8.0      Eos % 1.7      Basophil % 1.6      Imm Grans % 0.7 (*)      Neut # 3.30      Lymph # 3.28      Mono # 0.60      Eos # 0.13      Baso # 0.12      Imm Grans # 0.05 (*)    CK - Normal         TROPONIN I - Normal    Troponin-I <0.006     MAGNESIUM - Normal    Magnesium  1.9     CBC W/ AUTO DIFFERENTIAL    Narrative:     The following orders were created for panel order CBC Auto Differential.  Procedure                               Abnormality         Status                     ---------                               -----------         ------                     CBC with Differential[[id number]]       Abnormal            Final result                 Please view results for these tests on the individual orders.   URINALYSIS, REFLEX TO URINE CULTURE   EXTRA TUBES    Narrative:     The following orders were created for panel order EXTRA TUBES.  Procedure                               Abnormality         Status                     ---------                               -----------         ------                     Light Blue Top Hold[[id number]]                                                        Light Green Top Hold[[id number]]                                                       Lavender Top Hold[[id number]]                                                          Gold Top Hold[[id number]]                                                                Please view results for these tests on the individual orders.   LIGHT BLUE TOP HOLD   LIGHT GREEN TOP HOLD   LAVENDER TOP HOLD   GOLD TOP HOLD   POCT GLUCOSE    POCT Glucose 106            Imaging Results    None          Medications   0.9% NaCl infusion (1,000 mLs Intravenous New Bag 3/22/25 1233)     Medical Decision Making  Evaluation of a 19-year-old male currently admitted to Behavioral Health unit with near syncopal episode   Initial blood pressure rapid response 70 systolic  Presents feeling better with stable vital signs.  Blood pressure 131/61.  Patient currently has no complaints  Exam with is grossly  normal    Differential diagnosis includes vasovagal syncope, dehydration, electrolyte derangement, cardiac arrhythmia    Amount and/or Complexity of Data Reviewed  Labs: ordered. Decision-making details documented in ED Course.  ECG/medicine tests: ordered and independent interpretation performed.     Details: Normal sinus rhythm, rate 74.  Normal NV and QRS interval.  No STEMI  No previous EKG for comparison    Risk  Prescription drug management.            [unfilled]                          Clinical Impression:  Final diagnoses:  [R55] Near syncope  [I95.1] Postural hypotension (Primary)  [Z00.8] Medical clearance for psychiatric admission          ED Disposition Condition    Discharge Stable          ED Prescriptions    None       Follow-up Information    None              [1]   Social History  Tobacco Use    Smoking status: Never     Passive exposure: Never    Smokeless tobacco: Never   Substance Use Topics    Alcohol use: No    Drug use: Never        KAROL Emanuel III, MD  03/22/25 9460

## 2025-03-22 NOTE — PLAN OF CARE
Problem: Adult Behavioral Health Plan of Care  Goal: Plan of Care Review  Outcome: Progressing  Goal: Patient-Specific Goal (Individualization)  Outcome: Progressing  Goal: Adheres to Safety Considerations for Self and Others  Outcome: Progressing  Goal: Absence of New-Onset Illness or Injury  Outcome: Progressing  Goal: Optimized Coping Skills in Response to Life Stressors  Outcome: Progressing     Problem: Depressive Signs/Symptoms  Goal: Enhanced Self-Esteem and Confidence (Depressive Signs/Symptoms)  Outcome: Progressing  Goal: Improved Sleep (Depressive Signs/Symptoms)  Outcome: Progressing     Problem: Suicidal Behavior  Goal: Suicidal Behavior is Absent or Managed  Outcome: Progressing     Problem: Suicide Risk  Goal: Absence of Self-Harm  Outcome: Progressing

## 2025-03-22 NOTE — PLAN OF CARE
Patient returned from ER via wheelchair with security and staff at side. NAD noted. Awake, alert and oriented. Instructed on importance of drinking fluids and eating. Meal and powerade provided.

## 2025-03-22 NOTE — PLAN OF CARE
Patient on phone. Staff heard a noise and noted patient sitting on floor. Staff rush to assist patient. Patient clammy and alert. Patient stated he felt dizzy. 11:54 - BP -71/42 (left arm). Blood glucose 100. Rapid response called and Shiv (HS) and ER notified. 11:57 - BP-93/54 (right arm). 11:59 - BP - 95/52 (right arm). ER/Rapid response team at side. Patient able to stand and get into wheelchair. 12:01 Escorted to ER for full assessment.

## 2025-03-22 NOTE — ED TRIAGE NOTES
Pt arrived to the ED c/o weakness with a fall and being cold/clammy while he was on the phone in the U. Pt remembers the fall and denies loss of consciousness. Pt denied hitting head. U reports pt's initial systolic pressure was in the 70s. .

## 2025-03-23 PROCEDURE — 25000003 PHARM REV CODE 250: Performed by: PSYCHIATRY & NEUROLOGY

## 2025-03-23 PROCEDURE — 11400000 HC PSYCH PRIVATE ROOM

## 2025-03-23 PROCEDURE — 25000003 PHARM REV CODE 250: Performed by: STUDENT IN AN ORGANIZED HEALTH CARE EDUCATION/TRAINING PROGRAM

## 2025-03-23 PROCEDURE — 99232 SBSQ HOSP IP/OBS MODERATE 35: CPT | Mod: ,,, | Performed by: PSYCHIATRY & NEUROLOGY

## 2025-03-23 RX ADMIN — ARIPIPRAZOLE 5 MG: 5 TABLET ORAL at 08:03

## 2025-03-23 RX ADMIN — ESCITALOPRAM OXALATE 10 MG: 10 TABLET ORAL at 08:03

## 2025-03-23 RX ADMIN — HYDROXYZINE PAMOATE 50 MG: 50 CAPSULE ORAL at 08:03

## 2025-03-23 NOTE — PROGRESS NOTES
PSYCHIATRY DAILY INPATIENT PROGRESS NOTE  SUBSEQUENT HOSPITAL VISIT    ENCOUNTER DATE: 3/23/2025  SITE: [facility]    DATE OF ADMISSION: 3/18/2025  4:24 AM  LENGTH OF STAY: 5 days      CHIEF COMPLAINT   Lam Rodriguez is a 19 y.o. male, seen during daily ng rounds on the inpatient unit.  Lam Rodriguez presented with the chief complaint of depression and anxiety    The patient was seen and examined. The chart was reviewed.     Reviewed notes from Rns, CTRS, and SW and labs from the last 24 hours.    The patient's case was discussed with the treatment team/care providers today including Rns    Staff reports no behavioral or management issues.     The patient has been compliant with treatment.      Subjective 03/23/2025       Today the patient reports he is doing pretty good, noting ongoing improvement since admission.     Of note, pt had syncopal episode yesterday while talking on the phone. Pt was evaluated in the ER where it was determined to be episode of vasovagal syncope. Did not hit head. He was cleared medically and returned to U without further incident.      He is attending groups which he states are beneficial. He does feel that medications are helping. Pt has been less isolative and withdrawn. He is pleasant, smiling and in good spirits during our interaction.     Again he reports substantial improvement in mood and anxiety since admission. He denies both this morning. Attributes this to medication, which he has tolerated well.    The patient denies any side effects to medications.    Per RN:  Following POC.  Makes needs known.  Denies SI/HI, AVH.  States he's ok.  Insolated in his room all evening.  Declined a shower.  Declined a snack.      Psychiatric ROS (observed, reported, or endorsed/denied):  Depressed mood - less  Interest/pleasure/anhedonia: fluctuating  Guilt/hopelessness/worthlessness - Continuing  Changes in Sleep - less  Changes in Appetite - less  Changes in Concentration -  less  Changes in Energy - fluctuating  PMA/R- fluctuating  Suicidal- active/passive ideations - less  Homicidal ideations: active/passive ideations - No    Hallucinations - No  Delusions - No  Disorganized behavior - No  Disorganized speech - No  Negative symptoms - No    Elevated mood - No  Decreased need for sleep - No  Grandiosity - No  Racing thoughts - No  Impulsivity - No  Irritability- No  Increased energy - No  Distractibility - No  Increase in goal-directed activity or PMA- No    Symptoms of OLIVIA - fluctuating  Symptoms of Panic Disorder- No  Symptoms of PTSD - No  Symptoms of OCD- less      Overall progress:  minimal        Psychotherapy:  Target symptoms: depression, anxiety   Why chosen therapy is appropriate versus another modality: relevant to diagnosis  Outcome monitoring methods: self-report  Therapeutic intervention type: supportive psychotherapy  Topics discussed/themes: building skills sets for symptom management, symptom recognition  The patient's response to the intervention is accepting. The patient's progress toward treatment goals is limited.   Duration of intervention: 16 minutes.        Medical ROS  Review of Systems   Constitutional:  Negative for chills and fever.   HENT:  Negative for hearing loss.    Eyes:  Negative for blurred vision and double vision.   Respiratory:  Negative for shortness of breath.    Cardiovascular:  Negative for chest pain and palpitations.   Gastrointestinal:  Negative for constipation, diarrhea, nausea and vomiting.   Genitourinary:  Negative for dysuria.   Musculoskeletal:  Negative for back pain and neck pain.   Skin:  Negative for rash.   Neurological:  Negative for dizziness and headaches.   Endo/Heme/Allergies:  Negative for environmental allergies.         PAST MEDICAL HISTORY   Past Medical History:   Diagnosis Date    Asthma     Depression     Pt endorses  symptoms of dfepression since age 14.    Psychiatric problem     Depressed mood; Suicidal Ideation            PSYCHOTROPIC MEDICATIONS   Scheduled Meds:   ARIPiprazole  5 mg Oral Daily    EScitalopram oxalate  10 mg Oral Daily     Continuous Infusions:  PRN Meds:.  Current Facility-Administered Medications:     hydrOXYzine pamoate, 50 mg, Oral, Q6H PRN    melatonin, 6 mg, Oral, Nightly PRN    nicotine, 1 patch, Transdermal, Daily PRN    OLANZapine, 10 mg, Oral, Q8H PRN **AND** OLANZapine, 10 mg, Intramuscular, Q8H PRN        EXAMINATION    VITALS   Vitals:    03/22/25 1159 03/22/25 1931 03/23/25 0734 03/23/25 0820   BP: (!) 95/52 (!) 144/79 138/70    BP Location: Right arm  Left arm    Patient Position: Sitting  Sitting    Pulse: (!) 57 80 82    Resp: 16 18 16    Temp:  98.6 °F (37 °C) 97.8 °F (36.6 °C)    TempSrc:   Tympanic    SpO2:  99% 99%    Weight:    103 kg (226 lb 15.4 oz)   Height:           Body mass index is 30.78 kg/m².        CONSTITUTIONAL  General Appearance: unremarkable, age appropriate    MUSCULOSKELETAL  Muscle Strength and Tone:no tremor, no tic  Abnormal Involuntary Movements: No  Gait and Station: non-ataxic    PSYCHIATRIC   Level of Consciousness: awake and alert   Orientation: person, place, and situation  Grooming: Hospital garb and Well groomed  Psychomotor Behavior: psychomotor retardation  Speech: soft, non-spontaneous, monotone  Language: grossly intact  Mood: ok  Affect: Consistent with mood  Thought Process: linear, logical  Associations: intact   Thought Content: less SI, denies HI, and no delusions  Perceptions: denies AH and denies  VH  Memory: Able to recall past events, Remote intact, and Recent intact  Attention:Attends to interview without distraction  Fund of Knowledge: Aware of current events and Vocabulary appropriate   Estimate if Intelligence:  Average based on work/education history, vocabulary and mental status exam  Insight: has awareness of illness  Judgment: behavior is adequate to circumstances        DIAGNOSTIC TESTING   Laboratory Results  Recent Results (from the  past 24 hours)   POCT glucose    Collection Time: 03/22/25 11:54 AM   Result Value Ref Range    POCT Glucose 100 70 - 110 mg/dL   POCT glucose    Collection Time: 03/22/25 12:13 PM   Result Value Ref Range    POCT Glucose 106 70 - 110 mg/dL   Comprehensive Metabolic Panel    Collection Time: 03/22/25 12:20 PM   Result Value Ref Range    Sodium 140 136 - 145 mmol/L    Potassium 3.9 3.5 - 5.1 mmol/L    Chloride 106 95 - 110 mmol/L    CO2 22 (L) 23 - 29 mmol/L    Glucose 109 70 - 110 mg/dL    BUN 16 6 - 20 mg/dL    Creatinine 1.1 0.5 - 1.4 mg/dL    Calcium 9.5 8.7 - 10.5 mg/dL    Protein Total 8.0 6.0 - 8.4 gm/dL    Albumin 4.5 3.5 - 5.2 g/dL    Bilirubin Total 0.8 0.1 - 1.0 mg/dL    ALP 71 40 - 150 unit/L    AST 19 11 - 45 unit/L    ALT 16 10 - 44 unit/L    Anion Gap 12 8 - 16 mmol/L    eGFR >60 >60 mL/min/1.73/m2   CK    Collection Time: 03/22/25 12:20 PM   Result Value Ref Range     20 - 200 U/L   Troponin I    Collection Time: 03/22/25 12:20 PM   Result Value Ref Range    Troponin-I <0.006 <=0.026 ng/mL   Magnesium    Collection Time: 03/22/25 12:20 PM   Result Value Ref Range    Magnesium  1.9 1.6 - 2.6 mg/dL   CBC with Differential    Collection Time: 03/22/25 12:20 PM   Result Value Ref Range    WBC 7.48 3.90 - 12.70 K/uL    RBC 5.20 4.60 - 6.20 M/uL    HGB 15.3 14.0 - 18.0 gm/dL    HCT 43.2 40.0 - 54.0 %    MCV 83 82 - 98 fL    MCH 29.4 27.0 - 50.0 pg    MCHC 35.4 32.0 - 36.0 g/dL    RDW 11.9 11.5 - 14.5 %    Platelet Count 227 150 - 450 K/uL    MPV 9.2 9.2 - 12.9 fL    Nucleated RBC 0 <=0 /100 WBC    Neut % 44.1 38 - 73 %    Lymph % 43.9 18 - 48 %    Mono % 8.0 4 - 15 %    Eos % 1.7 <=8 %    Basophil % 1.6 <=1.9 %    Imm Grans % 0.7 (H) 0.0 - 0.5 %    Neut # 3.30 1.8 - 7.7 K/uL    Lymph # 3.28 1 - 4.8 K/uL    Mono # 0.60 0.3 - 1 K/uL    Eos # 0.13 <=0.5 K/uL    Baso # 0.12 <=0.2 K/uL    Imm Grans # 0.05 (H) 0.00 - 0.04 K/uL   Light Blue Top Hold    Collection Time: 03/22/25 12:27 PM   Result Value Ref  Range    Extra Tube Hold for add-ons.    Light Green Top Hold    Collection Time: 03/22/25 12:27 PM   Result Value Ref Range    Extra Tube Hold for add-ons.    Lavender Top Hold    Collection Time: 03/22/25 12:27 PM   Result Value Ref Range    Extra Tube Hold for add-ons.    Gold Top Hold    Collection Time: 03/22/25 12:27 PM   Result Value Ref Range    Extra Tube Hold for add-ons.    Urinalysis, Reflex to Urine Culture    Collection Time: 03/22/25  1:28 PM    Specimen: Urine   Result Value Ref Range    Color, UA Yellow Straw, Jody, Yellow, Light-Orange    Appearance, UA Clear Clear    pH, UA 7.0 5.0 - 8.0    Spec Grav UA 1.025 1.005 - 1.030    Protein, UA 1+ (A) Negative    Glucose, UA Negative Negative    Ketones, UA Negative Negative    Bilirubin, UA Negative Negative    Blood, UA Negative Negative    Nitrites, UA Negative Negative    Urobilinogen, UA 1.0 <2.0 EU/dL    Leukocyte Esterase, UA Negative Negative   Urinalysis Microscopic    Collection Time: 03/22/25  1:28 PM   Result Value Ref Range    RBC, UA 3 0 - 4 /HPF    WBC, UA 0 0 - 5 /HPF    Bacteria, UA Rare None, Rare, Occasional /HPF    Squamous Epithelial Cells, UA 2 /HPF    Hyaline Casts, UA 2 (H) 0 - 1 /LPF    Microscopic Comment             MEDICAL DECISION MAKING          ASSESSMENT      MDD, recurrent, severe  OCD  Social anxiety    Psychosocial stressors    Obesity        PROBLEM LIST AND MANAGEMENT PLANS        MDD, recurrent, severe  - started/continue lexapro 10 mg PO qd  - started/continue adjunctive abilify 2 mg po q day  -increase to 5 mg PO qd tomorrow  - pt counseled  - follow up with outpatient mental health providers after discharge for medication management and psychotherapy     OCD  - started/continue lexapro 10 mg PO qd  - pt counseled  - follow up with outpatient mental health providers after discharge for medication management and psychotherapy    Social anxiety  - started/continue lexapro 10 mg PO qd  - pt counseled  - follow up  with outpatient mental health providers after discharge for medication management and psychotherapy        Psychosocial stressors  - pt counseled  - SW consulted for assistance with additional resources          Discussed diagnosis, risks and benefits of proposed treatment vs alternative treatments vs no treatment, potential side effects of these treatments and the inherent unpredictability of treatment. The patient expresses understanding of the above and displays the capacity to agree with this treatment given said understanding. Patient also agrees that, currently, the benefits outweigh the risks and would like to pursue/continue treatment at this time.    Any medications being used off-label were discussed with the patient inclusive of the evidence base for the use of the medications and consent was obtained for the off-label use of the medication.       DISCHARGE PLANNING  Expected Disposition Plan: Home or Self Care      NEED FOR CONTINUED HOSPITALIZATION  Psychiatric illness continues to pose a potential threat to life or bodily function, of self or others, thereby requiring the need for continued inpatient psychiatric hospitalization: Yes, due to: danger to self, as evidenced by:  Concerns with SI--Fading.    Protective inpatient pyschiatric hospitalization required while a safe disposition plan is enacted: Yes    Patient stabilized and ready for discharge from inpatient psychiatric unit: No        STAFF:   Costa Garza MD  Psychiatry

## 2025-03-23 NOTE — PLAN OF CARE
Patient restricted and guarded. Isolates in the room with no interaction with peers. Increased fluid intake. Denies any complaints of dizziness or weakness. Out of room for meals and snacks. Reports improved mood. Medications reviewed and accepted. Plan of care ongoing.

## 2025-03-23 NOTE — PLAN OF CARE
Calm and cooperative this evening.  Denies suicidal thoughts at this time.  Healthy ways of coping discussed.  Declined snack.  Spoke with his mom on phone.  Isolating in room most of evening.  Steady gait.  Encouraged pt to sit on side of bed for 20 seconds before standing up when getting up from laying position.  He voiced understanding.

## 2025-03-24 PROBLEM — F33.2 MDD (MAJOR DEPRESSIVE DISORDER), RECURRENT SEVERE, WITHOUT PSYCHOSIS: Status: ACTIVE | Noted: 2025-03-24

## 2025-03-24 PROBLEM — R45.851 SUICIDAL IDEATION: Status: RESOLVED | Noted: 2025-03-18 | Resolved: 2025-03-24

## 2025-03-24 LAB
OHS QRS DURATION: 96 MS
OHS QTC CALCULATION: 421 MS

## 2025-03-24 PROCEDURE — 25000003 PHARM REV CODE 250: Performed by: STUDENT IN AN ORGANIZED HEALTH CARE EDUCATION/TRAINING PROGRAM

## 2025-03-24 PROCEDURE — 90833 PSYTX W PT W E/M 30 MIN: CPT | Mod: ,,, | Performed by: PSYCHIATRY & NEUROLOGY

## 2025-03-24 PROCEDURE — 99233 SBSQ HOSP IP/OBS HIGH 50: CPT | Mod: ,,, | Performed by: PSYCHIATRY & NEUROLOGY

## 2025-03-24 PROCEDURE — 11400000 HC PSYCH PRIVATE ROOM

## 2025-03-24 PROCEDURE — 25000003 PHARM REV CODE 250: Performed by: PSYCHIATRY & NEUROLOGY

## 2025-03-24 RX ORDER — ARIPIPRAZOLE 5 MG/1
5 TABLET ORAL DAILY
Qty: 30 TABLET | Refills: 1 | Status: SHIPPED | OUTPATIENT
Start: 2025-03-25 | End: 2026-03-25

## 2025-03-24 RX ORDER — ESCITALOPRAM OXALATE 10 MG/1
15 TABLET ORAL DAILY
Qty: 45 TABLET | Refills: 1 | Status: SHIPPED | OUTPATIENT
Start: 2025-03-25 | End: 2025-05-24

## 2025-03-24 RX ADMIN — ESCITALOPRAM OXALATE 10 MG: 10 TABLET ORAL at 08:03

## 2025-03-24 RX ADMIN — ARIPIPRAZOLE 5 MG: 5 TABLET ORAL at 08:03

## 2025-03-24 RX ADMIN — Medication 6 MG: at 08:03

## 2025-03-24 RX ADMIN — HYDROXYZINE PAMOATE 50 MG: 50 CAPSULE ORAL at 08:03

## 2025-03-24 NOTE — PROGRESS NOTES
"PSYCHIATRY DAILY INPATIENT PROGRESS NOTE  SUBSEQUENT HOSPITAL VISIT    ENCOUNTER DATE: 3/24/2025  [facility]    DATE OF ADMISSION: 3/18/2025  4:24 AM  LENGTH OF STAY: 6 days      CHIEF COMPLAINT   Lam Rodriguez is a 19 y.o. male, seen during daily ng rounds on the inpatient unit.  Lam Rodriguez presented with the chief complaint of depression and anxiety    The patient was seen and examined. The chart was reviewed.     Reviewed notes from Rns and MD and labs from the last 24 hours.    The patient's case was discussed with the treatment team/care providers today including Rns    Staff reports no behavioral or management issues.     The patient has been compliant with treatment.      Subjective 03/24/2025       Today the patient again reports he is doing pretty good, noting ongoing improvement since admission.    He feels that the phone conversations and visits with his parents was helpful. "I feel that they love ad support."    Over the weekend, the pt had syncopal episode yesterday while talking on the phone. Pt was evaluated in the ER where it was determined to be episode of vasovagal syncope. Did not hit head. He was cleared medically and returned to U without further incident.     -no further symptoms of headedness or dizziness.     He is attending groups which he states are beneficial. He is actively participating in he is care. He does feel that medications are helping. Pt has been much less isolative and withdrawn. He is pleasant, smiling and in good spirits during our interaction.     He is more hopeful and future oriented techniques.    Again he reports substantial improvement in mood and anxiety since admission. He denies both this morning. Attributes this to medication, which he has tolerated well.  -he will be stable for discharge tomorrow     The patient denies any side effects to medications.        Psychiatric ROS (observed, reported, or endorsed/denied):  Depressed mood - improving " steadily  Interest/pleasure/anhedonia: improving steadily  Guilt/hopelessness/worthlessness - improving steadily  Changes in Sleep - improving steadily  Changes in Appetite - improving steadily  Changes in Concentration - improving steadily  Changes in Energy - improving steadily  PMA/R- improving steadily  Suicidal- active/passive ideations - denies  Homicidal ideations: active/passive ideations - No    Hallucinations - No  Delusions - No  Disorganized behavior - No  Disorganized speech - No  Negative symptoms - No    Elevated mood - No  Decreased need for sleep - No  Grandiosity - No  Racing thoughts - No  Impulsivity - No  Irritability- No  Increased energy - No  Distractibility - No  Increase in goal-directed activity or PMA- No    Symptoms of OLIVIA - fluctuating  Symptoms of Panic Disorder- No  Symptoms of PTSD - No  Symptoms of OCD- less      Overall progress: Patient is showing moderate improvement        Psychotherapy:  Target symptoms: depression, anxiety   Why chosen therapy is appropriate versus another modality: relevant to diagnosis  Outcome monitoring methods: self-report  Therapeutic intervention type: supportive psychotherapy  Topics discussed/themes: building skills sets for symptom management, symptom recognition; coping skills  The patient's response to the intervention is accepting. The patient's progress toward treatment goals is limited.   Duration of intervention: 16 minutes.        Medical ROS  Review of Systems   Constitutional:  Negative for chills and fever.   HENT:  Negative for hearing loss.    Eyes:  Negative for blurred vision and double vision.   Respiratory:  Negative for shortness of breath.    Cardiovascular:  Negative for chest pain and palpitations.   Gastrointestinal:  Negative for constipation, diarrhea, nausea and vomiting.   Genitourinary:  Negative for dysuria.   Musculoskeletal:  Negative for back pain and neck pain.   Skin:  Negative for rash.   Neurological:  Negative for  "dizziness and headaches.   Endo/Heme/Allergies:  Negative for environmental allergies.         PAST MEDICAL HISTORY   Past Medical History:   Diagnosis Date    Asthma     Depression     Pt endorses  symptoms of dfepression since age 14.    Psychiatric problem     Depressed mood; Suicidal Ideation           PSYCHOTROPIC MEDICATIONS   Scheduled Meds:   ARIPiprazole  5 mg Oral Daily    EScitalopram oxalate  10 mg Oral Daily     Continuous Infusions:  PRN Meds:.  Current Facility-Administered Medications:     hydrOXYzine pamoate, 50 mg, Oral, Q6H PRN    melatonin, 6 mg, Oral, Nightly PRN    nicotine, 1 patch, Transdermal, Daily PRN    OLANZapine, 10 mg, Oral, Q8H PRN **AND** OLANZapine, 10 mg, Intramuscular, Q8H PRN        EXAMINATION    VITALS   Vitals:    03/23/25 0734 03/23/25 0820 03/23/25 1904 03/24/25 0729   BP: 138/70  136/71 136/68   BP Location: Left arm   Left arm   Patient Position: Sitting   Sitting   Pulse: 82  92 86   Resp: 16  18 16   Temp: 97.8 °F (36.6 °C)  98.1 °F (36.7 °C) 98.1 °F (36.7 °C)   TempSrc: Tympanic   Tympanic   SpO2: 99%  98% 98%   Weight:  103 kg (226 lb 15.4 oz)     Height:           Body mass index is 30.78 kg/m².        CONSTITUTIONAL  General Appearance: unremarkable, age appropriate    MUSCULOSKELETAL  Muscle Strength and Tone:no tremor, no tic  Abnormal Involuntary Movements: No  Gait and Station: non-ataxic    PSYCHIATRIC   Level of Consciousness: awake and alert   Orientation: person, place, and situation  Grooming: Hospital garb and Well groomed  Psychomotor Behavior: normal, cooperative, friendly and cooperative  Speech: normal tone, normal rate, normal pitch, soft, spontaneous  Language: grossly intact, able to name, able to repeat  Mood: "ok"  Affect: Consistent with mood  Thought Process: linear, logical  Associations: intact; no vu  Thought Content: denies SI, denies HI, and no delusions  Perceptions: denies AH and denies  VH  Memory: Able to recall past events, Remote " intact, and Recent intact  Attention:Attends to interview without distraction  Fund of Knowledge: Aware of current events and Vocabulary appropriate   Estimate if Intelligence:  Average based on work/education history, vocabulary and mental status exam  Insight: has awareness of illness- fair/improving  Judgment: behavior is adequate to circumstances- fair/improving         DIAGNOSTIC TESTING   Laboratory Results  No results found for this or any previous visit (from the past 24 hours).          MEDICAL DECISION MAKING          ASSESSMENT      MDD, recurrent, severe, with psychotic features  OCD  Social anxiety disorder    Psychosocial stressors    Obesity        PROBLEM LIST AND MANAGEMENT PLANS        MDD, recurrent, severe  - started/continue lexapro 10 mg PO qd- increase to 15 mg po q day   - started/continue adjunctive abilify 2 mg po q day  -increase to 5 mg PO qd tomorrow  - pt counseled  - follow up with outpatient mental health providers after discharge for medication management and psychotherapy     OCD  - started/continue lexapro as above  - pt counseled  - follow up with outpatient mental health providers after discharge for medication management and psychotherapy    Social anxiety  - started/continue lexapro as above  - pt counseled  - follow up with outpatient mental health providers after discharge for medication management and psychotherapy        Psychosocial stressors  - pt counseled  - SW consulted for assistance with additional resources          Discussed diagnosis, risks and benefits of proposed treatment vs alternative treatments vs no treatment, potential side effects of these treatments and the inherent unpredictability of treatment. The patient expresses understanding of the above and displays the capacity to agree with this treatment given said understanding. Patient also agrees that, currently, the benefits outweigh the risks and would like to pursue/continue treatment at this  time.    Any medications being used off-label were discussed with the patient inclusive of the evidence base for the use of the medications and consent was obtained for the off-label use of the medication.       DISCHARGE PLANNING  Expected Disposition Plan: Home or Self Care- discharge tomorrow      NEED FOR CONTINUED HOSPITALIZATION  Psychiatric illness continues to pose a potential threat to life or bodily function, of self or others, thereby requiring the need for continued inpatient psychiatric hospitalization: Yes, due to: danger to self, as evidenced by:  Concerns with SI--Fading.    Protective inpatient pyschiatric hospitalization required while a safe disposition plan is enacted: Yes    Patient stabilized and ready for discharge from inpatient psychiatric unit: No        STAFF:   Carl Newton MD  Psychiatry

## 2025-03-24 NOTE — PROGRESS NOTES
"   03/24/25 1420   [facility] Group Therapy   Group Name Therapeutic Recreation   Specific Interventions Cognitive Stimulation Training   Participation Level Appropriate;Attentive;Sharing   Participation Quality Cooperative   Insight/Motivation Improved   Affect/Mood Display Appropriate   Cognition Alert   Psychomotor WNL     Patient presents calm, cooperative, reports an "alright" mood. Patient shared 3 of his favorite leisure activities, exercise to improve my physical health, reading for entertainment and distract my mind from stress and sitting outside for relaxation.  "

## 2025-03-24 NOTE — PLAN OF CARE
Isolates in room, appropriate eye contact, calm/cooperative, verbal contract for safety, plan of care ongoing, refused snacks. Medications reviewed. Encouraged to participate in groups. Monitored Q15 minutes for falls and safety precautions per staff. Will continue to monitor for needs and safety. Education on making goals and socializing encouraged. Pt reading.

## 2025-03-24 NOTE — MEDICAL/APP STUDENT
See staff notes      Medical ROS  Review of Systems   Constitutional:  Negative for chills, fever and weight loss.   HENT: Negative.     Eyes: Negative.    Respiratory:  Negative for cough, shortness of breath and wheezing.    Cardiovascular:  Negative for chest pain and palpitations.   Gastrointestinal:  Negative for abdominal pain, constipation, diarrhea, nausea and vomiting.   Genitourinary: Negative.    Musculoskeletal: Negative.    Neurological: Negative.    Endo/Heme/Allergies: Negative.    Psychiatric/Behavioral:  Positive for depression.

## 2025-03-24 NOTE — PLAN OF CARE
Patient calm and cooperative. Isolating in room reading a book. Flat affect. Denies depression and anxiety. When asked patient his plans after discharge patient answered he will sit outside and exercise. No self harming behavior displayed, no aggressive behavior displayed towards others. Patient verbally contracted for safety with staff and unit. Discussed healthy coping skills and techniques. Educated, reviewed and discussed plan of care and medication regiment with this patient 1:1. Patient voiced understanding of all teachings.

## 2025-03-24 NOTE — PSYCH
"Meditation/Education:  Mindful Walk : Meditation Exercise  Patient Response:  "Patient attended session and found the session helpful".              "

## 2025-03-24 NOTE — PROGRESS NOTES
"   03/24/25 1000   [facility] Group Therapy   Group Name Leisure Education  (leisure skills crossword puzzle)   Specific Interventions Leisure Counseling  (increase understanding of words related to leisure)   Participation Level Appropriate;Sharing   Participation Quality Cooperative   Insight/Motivation Improved   Affect/Mood Display Appropriate   Cognition Alert   Psychomotor WNL     Patient presents calm, cooperative, quiet unless approached, reports a "good" mood, "I've made progress by going to groups and being more sociable, while also reading books and doing pushups to distract my mind from being bored."  "

## 2025-03-24 NOTE — PLAN OF CARE
Plan of care reviewed. Denies intent to harm self or others at this time. Denies hallucinations and delusions. Accepts snacks and medications. Gait steady, no falls. States is looking forward to going home tomorrow. Patient does not have any plans when he gets home other than exercising. Limited interaction noted with staff and peers.  Will continue precautions and monitor for safety.

## 2025-03-25 VITALS
DIASTOLIC BLOOD PRESSURE: 64 MMHG | BODY MASS INDEX: 30.74 KG/M2 | WEIGHT: 226.94 LBS | HEIGHT: 72 IN | TEMPERATURE: 98 F | HEART RATE: 97 BPM | RESPIRATION RATE: 18 BRPM | SYSTOLIC BLOOD PRESSURE: 111 MMHG | OXYGEN SATURATION: 98 %

## 2025-03-25 PROCEDURE — 25000003 PHARM REV CODE 250: Performed by: STUDENT IN AN ORGANIZED HEALTH CARE EDUCATION/TRAINING PROGRAM

## 2025-03-25 PROCEDURE — 25000003 PHARM REV CODE 250: Performed by: PSYCHIATRY & NEUROLOGY

## 2025-03-25 PROCEDURE — 90833 PSYTX W PT W E/M 30 MIN: CPT | Mod: ,,, | Performed by: PSYCHIATRY & NEUROLOGY

## 2025-03-25 PROCEDURE — 99239 HOSP IP/OBS DSCHRG MGMT >30: CPT | Mod: ,,, | Performed by: PSYCHIATRY & NEUROLOGY

## 2025-03-25 RX ADMIN — ARIPIPRAZOLE 5 MG: 5 TABLET ORAL at 08:03

## 2025-03-25 RX ADMIN — ESCITALOPRAM OXALATE 15 MG: 5 TABLET, FILM COATED ORAL at 08:03

## 2025-03-25 NOTE — PSYCH
Pt discharge to home with follow-up at [facility]. Appointment has been scheduled on Monday, 03/31/2025 @ 8:30 am. Pt will receive medication management, counseling for behavioral health and smoking cessation. Pt given prescriptions for psychotropic medications and Nicoderm CQ patches 14mg/24hr. AVS faxed 03/25/2025 @ 12:00 pm.

## 2025-03-25 NOTE — PSYCH
"Lam Rodriguze MRN:[id number] ([facility]#[id number]) (19 y.o. M) (Adm: 25)  [facility] [id number]  Patient Demographics    Patient Name  Lam Rodriguez Legal Sex  Male          Age  2005 (19 y.o.) N  [id number] Address  [address] [address] Phone  [phone] (Mobile) *Preferred*     Patient Demographics    Address  [address] [address] Phone  [phone] (Mobile) *Preferred* E-mail Address  [email or web address]     "Primary Care Provider"  [facility] "Phone"  [phone]     Emergency Contacts    Name Relation Home Work Mobile   Evelyn Rodriguez Mother   [phone]   Keerthi Mayers Step parent   [phone]      Other Contacts    None on File    Documents on File     Status Date Received Description   Documents for the Patient   Notice of Privacy Pract Ackn Received 01/03/15    Insurance Documents Not Received     Patient ID Not Received  [place] 10/18 STEPDAD   Advance Directive Acknowledgement Received 25    Clinic Authorization      Provider Based Acknowledgement      Authorization to Release Protected Health Information  08/11/15    Plain Language Summary English No, Patient Declined Print () 16    Plain Language Summary English No, Patient Declined Print () 16    Plain Language Summary English No, Patient Declined Print () 17    Plain Language Summary English No, Patient Declined Print () 17    [facility] Provider Based Facility Disclosure      Plain Language Summary English No, Patient Declined Print () 10/31/17    Plain Language Summary English Acknowledged () 18    Plain Language Summary English Acknowledged () 22    Plain Language Summary English Acknowledged () 24    Plain Language Summary English Acknowledged () 25    Patient Rights and Responsibilities Acknowledged 25    [facility] Not Contracted Facility Disclosure Signed 25    [facility] " Contracted Facility Disclosure      Plain Language Summary English      Notice of Nondiscrimination and Accessibility Signed 25    Plain Language Summary English Acknowledged () 25    Notice of Nondiscrimination and Accessibility  (Deleted)     Documents for the Encounter   Medicare Lifetime Reserve Form      Important Medicare Message Printed at Discharge      Advance Beneficiary Notice      Hospital Authorization Received 25    Flowsheets Nursing Received 25      Admission Information    Current Information    Attending Provider Admitting Provider Admission Type Admission Status   Carl Newton MD Blanchard, Michael J., MD Urgent Confirmed Admission          Admission Date/Time Discharge Date Hospital Service Auth/Cert Status   25  0424  Psychiatry Incomplete          Hospital Area Unit Room/Bed    [facility] UN[facility] 210/210 D            Discharge Disposition Discharge Destination   Home or Self Care      Admission    Complaint        Hospital Account    Name Acct ID Class Status Primary Coverage   Lam Rodriguez [id number] IP- Psych Open MEDICAID - PENDING MEDICAID          Guarantor Account (for Hospital Account #[id number])    Name Relation to Pt Service Area Active? Acct Type   Lam Rodriguez Self [facility] Yes Behavioral Health   Address Phone     [address] [place] [address]             Coverage Information (for Hospital Account #[id number])    F/O Payor/Plan Precert #   MEDICAID/PENDING MEDICAID    Subscriber Subscriber #   Lam Rodriguez [id number]   Address Phone   [address] [address]  [place] [address] [phone]

## 2025-03-25 NOTE — NURSING
AVS instructed to pt on discharge information. Meds reviewed. Pt denies SI, HI, AVH. Mood WNL. Pt denies distress. Pt verbalized understanding on all d/c instructions. Pt escorted off unit downstairs by unit staff. NAD  
New Admit: Pt is sleeping at this time and has slept 1 hr. Resp even & unlabored. Pathways clear. Q 15 minute safety checks ongoing. All precautions maintained.   
POC reviewed this shift and is ongoing.  Pt was cooperative with care had no medications due this shift, but accepted PRN vistaril and melatonin for sleep.  Pt was primarily isolated to self and room this shift.  Pt denies SI/HI/AVH.  Pt reports that his mother visited him today and the visit went well.  Pt states that his plans after discharge include being more active by going outside and talking walks.  Pt also spoke about missing his pet dogs, which usually sleep with him at night.    
Patient arrived to [facility] per wheelchair with hospital security and U staff. Patient scanned by Proscreen 200. Patient is a alert,  calm, cooperative and ambulatory.  Personal property inventoried and placed in appropriate  area. Patient's notification of rights, mental health advocacy information, unit rules, schedules, policies and general information reviewed with patient. Handouts given and paper work signed.   
Pt has slept 8 hours this shift with no awakenings.  
Pt is sleeping at this time and has slept 8 hrs. Resp even & unlabored. Pathways clear. Q 15 minute safety checks ongoing. All precautions maintained.   
Pt is sleeping at this time and has slept 9 hrs. Resp even & unlabored. Pathways clear. Q 15 minute safety checks ongoing. All precautions maintained.   
Pt sleeping at this time, slept 8 hrs with no awakenings. NAD. Resp even and unlabored.Pathways clear,bed in low position. Q 15 min safety check ongoing.All precautions maintained.  
Pt sleeping at this time, slept 8 hrs with no awakenings. NAD. Resp even and unlabored.Pathways clear,bed in low position. Q 15 min safety check ongoing.All precautions maintained.  
Pt slept approximately 8.5 hours. Resp even and unlabored. Safety checks ongoing and precautions maintained.  
Rested quietly with closed eyes and even resp for 8 hours.  Modified VC and all precautions maintained.  Pathways clear and bed in low position.     
yrs (specify)

## 2025-03-25 NOTE — PROGRESS NOTES
"   03/25/25 1000   [facility] Group Therapy   Group Name Therapeutic Recreation   Specific Interventions Cognitive Stimulation Training   Participation Level Appropriate;Attentive;Sharing   Participation Quality Cooperative;Social   Insight/Motivation Applies New Skills;Good   Affect/Mood Display Appropriate   Cognition Alert   Psychomotor WNL     Patient presents calm, cooperative, participates willingly, socialized with peers, Patient states "I'm feeling fine and my coping skills are reading, exercise and watching TV and I'm working on being more sociable by talking to others and doing groups."  "

## 2025-03-25 NOTE — PSYCH
"Meditation/Education:  Morning Routine: Meditation Exercise  Patient Response:  "Patient attended session and found the session helpful".              "

## 2025-03-25 NOTE — PROGRESS NOTES
Psychotherapy:  Target symptoms: depression, anxiety   Why chosen therapy is appropriate versus another modality: relevant to diagnosis  Outcome monitoring methods: self-report  Therapeutic intervention type: supportive psychotherapy  Topics discussed/themes: building skills sets for symptom management, symptom recognition; coping skills  Safety planning and wrap up session  The patient's response to the intervention is accepting. The patient's progress toward treatment goals is fair.   Duration of intervention: 16 minutes.  Carl Newton MD  Psychiatry
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
## 2025-03-25 NOTE — PSYCH
Pt's follow-up appointment has been scheduled at [facility] on Monday, 03/31/2025 @ 8:30 am. Follow-up note entered.

## 2025-03-25 NOTE — PSYCH
LPC faxed aftercare referral packet to [facility] for follow-up appointment. LPC will check status in 30 minutes.

## 2025-03-25 NOTE — PSYCH
Latest Reference Range & Units 03/18/25 01:33   WBC 3.90 - 12.70 K/uL 5.69   RBC 4.60 - 6.20 M/uL 5.05   Hemoglobin 14.0 - 18.0 g/dL 14.6   Hematocrit 40.0 - 54.0 % 41.7   MCV 82 - 98 fL 83   MCH 27.0 - 31.0 pg 28.9   MCHC 32.0 - 36.0 g/dL 35.0   RDW 11.5 - 14.5 % 12.2   Platelet Count 150 - 450 K/uL 226   MPV 9.2 - 12.9 fL 9.2   Gran % 38.0 - 73.0 % 61.5   Lymph % 18.0 - 48.0 % 27.4   Mono % 4.0 - 15.0 % 7.0   Eos % 0.0 - 8.0 % 1.6   Basophil % 0.0 - 1.9 % 2.1 (H)   Immature Granulocytes 0.0 - 0.5 % 0.4   Gran # (ANC) 1.8 - 7.7 K/uL 3.5   Lymph # 1.0 - 4.8 K/uL 1.6   Mono # 0.3 - 1.0 K/uL 0.4   Eos # 0.0 - 0.5 K/uL 0.1   Baso # 0.00 - 0.20 K/uL 0.12   Immature Grans (Abs) 0.00 - 0.04 K/uL 0.02   nRBC 0 /100 WBC 0   Differential Method  Automated   (H): Data is abnormally high     Latest Reference Range & Units 03/18/25 01:33   Alcohol, Serum <10 mg/dL <10   Acetaminophen Level 10.0 - 20.0 ug/mL <3.0 (L)   (L): Data is abnormally low     Latest Reference Range & Units 03/18/25 03:12   Benzodiazepines Negative  Negative   Methadone metabolites Negative  Negative   Phencyclidine Negative  Negative   Toxicology Information  SEE COMMENT   Cocaine, Urine Negative  Negative   Opiates, Urine Negative  Negative   Barbituates, Urine Negative  Negative   Amphetamines, Urine Negative  Negative   Marijuana (THC) Metabolite Negative  Negative      Latest Reference Range & Units 03/18/25 03:12   Specimen UA  Urine, Clean Catch   Color, UA Yellow, Straw, Jody  Yellow   Appearance, UA Clear  Clear   Spec Grav UA 1.005 - 1.030  >=1.030 !   pH, UA 5.0 - 8.0  6.0   Protein, UA Negative  2+ !   Glucose, UA Negative  Negative   Ketones, UA Negative  1+ !   Blood, UA Negative  Negative   NITRITE UA Negative  Negative   UROBILINOGEN UA <2.0 EU/dL Negative   Bilirubin (UA) Negative  Negative   Leukocyte Esterase, UA Negative  Negative   RBC, UA 0 - 4 /hpf 0   WBC, UA 0 - 5 /hpf 1   Bacteria, UA None-Occ /hpf Rare   Squam Epithel, UA /hpf  1   Hyaline Casts, UA 0-1/lpf /lpf 0   Microscopic Comment  SEE COMMENT   Urine Creatinine 23.0 - 375.0 mg/dL >450.0 (H)   !: Data is abnormal  (H): Data is abnormally high

## 2025-03-25 NOTE — MEDICAL/APP STUDENT
See staff notes  Review of Systems   Constitutional:  Negative for chills, fever and weight loss.   HENT: Negative.     Eyes: Negative.    Respiratory:  Negative for cough, shortness of breath and wheezing.    Cardiovascular:  Negative for chest pain and palpitations.   Gastrointestinal:  Negative for constipation, diarrhea, nausea and vomiting.   Genitourinary: Negative.    Musculoskeletal: Negative.    Skin: Negative.    Neurological: Negative.    Psychiatric/Behavioral:  Positive for depression.